# Patient Record
Sex: FEMALE | Race: BLACK OR AFRICAN AMERICAN | NOT HISPANIC OR LATINO
[De-identification: names, ages, dates, MRNs, and addresses within clinical notes are randomized per-mention and may not be internally consistent; named-entity substitution may affect disease eponyms.]

---

## 2017-12-25 ENCOUNTER — FORM ENCOUNTER (OUTPATIENT)
Age: 62
End: 2017-12-25

## 2017-12-26 ENCOUNTER — FORM ENCOUNTER (OUTPATIENT)
Age: 62
End: 2017-12-26

## 2017-12-26 ENCOUNTER — APPOINTMENT (OUTPATIENT)
Dept: ORTHOPEDIC SURGERY | Facility: CLINIC | Age: 62
End: 2017-12-26
Payer: COMMERCIAL

## 2017-12-26 ENCOUNTER — OUTPATIENT (OUTPATIENT)
Dept: OUTPATIENT SERVICES | Facility: HOSPITAL | Age: 62
LOS: 1 days | End: 2017-12-26
Payer: COMMERCIAL

## 2017-12-26 VITALS — HEIGHT: 64 IN | WEIGHT: 175 LBS | BODY MASS INDEX: 29.88 KG/M2

## 2017-12-26 PROCEDURE — 73502 X-RAY EXAM HIP UNI 2-3 VIEWS: CPT | Mod: 26,LT

## 2017-12-26 PROCEDURE — 99214 OFFICE O/P EST MOD 30 MIN: CPT

## 2017-12-26 PROCEDURE — 73502 X-RAY EXAM HIP UNI 2-3 VIEWS: CPT

## 2017-12-26 RX ORDER — NAPROXEN 500 MG/1
TABLET ORAL
Refills: 0 | Status: COMPLETED | COMMUNITY

## 2019-02-25 ENCOUNTER — FORM ENCOUNTER (OUTPATIENT)
Age: 64
End: 2019-02-25

## 2019-02-26 ENCOUNTER — OUTPATIENT (OUTPATIENT)
Dept: OUTPATIENT SERVICES | Facility: HOSPITAL | Age: 64
LOS: 1 days | End: 2019-02-26
Payer: COMMERCIAL

## 2019-02-26 ENCOUNTER — APPOINTMENT (OUTPATIENT)
Dept: ORTHOPEDIC SURGERY | Facility: CLINIC | Age: 64
End: 2019-02-26
Payer: MEDICAID

## 2019-02-26 VITALS — BODY MASS INDEX: 28.17 KG/M2 | HEIGHT: 64 IN | WEIGHT: 165 LBS

## 2019-02-26 PROCEDURE — 99214 OFFICE O/P EST MOD 30 MIN: CPT

## 2019-02-26 PROCEDURE — 73564 X-RAY EXAM KNEE 4 OR MORE: CPT | Mod: 26,LT

## 2019-02-26 PROCEDURE — 73502 X-RAY EXAM HIP UNI 2-3 VIEWS: CPT

## 2019-02-26 PROCEDURE — 73564 X-RAY EXAM KNEE 4 OR MORE: CPT

## 2019-02-26 PROCEDURE — 73502 X-RAY EXAM HIP UNI 2-3 VIEWS: CPT | Mod: 26,LT

## 2019-02-26 NOTE — PHYSICAL EXAM
[de-identified] : Constitutional: Well appearing. No acute distress.\par Mental Status: Alert & oriented to person, place and time. Normal affect.\par Pulmonary: No respiratory distress. Normal chest excursion.\par \par Gait: Severely coxalgic.\par Ambulatory assist devices: None.\par \par Cervical spine: Skin intact. No visible deformity. Painless active ROM without evident restriction.\par Bilateral upper extremities: Skin intact. No deformity. Painless active ROM without evident restriction.\par Thoracolumbar spine: No deformity. No tenderness. No radicular pain on passive straight leg raise bilaterally.\par Pelvis: No pelvic obliquity. No tenderness.\par Leg lengths: Left leg almost 1cm shorter than right.\par \par Right Hip:\par Skin intact.\par Well healed surgical scar.\par No erythema.\par No ecchymosis.\par No swelling.\par No deformity.\par No focal tenderness.\par Painless ROM from full extension to 90 degrees of flexion. 0-5 degrees of internal rotation. 55 degrees of external rotation. 45-50 degrees of abduction. 20 degrees of adduction.\par No crepitation.\par No instability.\par TISH painless.\par Impingement (FADIR) painless.\par Stinchfield painless.\par Abductor power 5/5.\par Flexor power 5/5.\par \par Left Hip:\par Skin intact.\par No surgical scars.\par No erythema.\par No ecchymosis.\par No swelling.\par No deformity.\par No focal tenderness.\par Painful ROM from full extension to 45 degrees of flexion. 10 degrees of obligate external rotation. 15 degrees of external rotation. 5 degrees of abduction. 0 degrees of adduction.\par No crepitation.\par No instability.\par TISH painful.\par Impingement (FADIR) painful.\par Stinchfield painful.\par Abductor power 5-/5.\par Flexor power 4/5 with pain.\par \par Bilateral Knees: Skin intact. No surgical scars. No erythema or ecchymosis. No swelling or effusion. No deformity. No focal tenderness. Painless and unrestricted range of motion. Central patellar tracking. No crepitation. No instability. \par \par Neurological: Intact distal crude touch sensation. Normal distal motor power.\par Cardiovascular: Palpable dorsalis pedis and posterior tibialis pulses. Brisk capillary refill. No peripheral edema.\par Lymphatics: No peripheral adenopathy appreciated. [de-identified] : X-ray imaging of the bilateral hips and pelvis done here today demonstrates severe bone on bone osteoarthritis of the left hip, flattening of femoral head, lateral subluxation of the joint, large osteophytes, subchondral cystic changes, shows a well-fixed, well-aligned right total hip replacement.\par \par

## 2019-02-26 NOTE — ADDENDUM
[FreeTextEntry1] : This note was written by Ivanna Gonzales on 02/26/2019  acting as scribe for Dr. Adams and [facility] Lee.\par

## 2019-02-26 NOTE — HISTORY OF PRESENT ILLNESS
[de-identified] : 64 y/o F presents today for left hip surgical consult. She is also s/p right THR 12/11/14 and is doing well with regards to the right hip. She reports marked, constant left hip pain and is unable to place shoes on the left foot. She can walk 5-10 blocks with a moderate limp and has to use stairs one step at a time. Patient reports that Mobic and lidocaine patches no longer work to relieve pain.

## 2019-02-26 NOTE — END OF VISIT
[FreeTextEntry3] : All medical record entries made by the Scribe were at my, Dr. Adams's, discretion and personally dictated by me on 02/26/2019. I have reviewed the chart and agree that the record accurately reflects my personal performance of the history, physical exam, assessment and plan. I have also personally directed, reviewed and agreed to the chart.

## 2019-02-26 NOTE — DISCUSSION/SUMMARY
[de-identified] : Patient has progressively severe hip pain and disability secondary to DJD and has failed extensive conservative care including activity modification, analgesic medications and therapeutic exercise. The patient was indicated for left total hip replacement.\par We discussed the details of the procedure, the expected recovery period, and the expected outcome. Bearing surface and fixation options were discussed, along with surgical approaches. For this patient I recommended a superior approach with possible extension to a posterior approach if appropriate for safe surgical exposure.\par We discussed the likelihood of satisfaction after complete recovery, and the potential causes of dissatisfaction. Specific risks of total hip replacement were discussed in detail. We discussed the risk of surgical site complications including but not limited to: surgical site infection, wound healing complications, bone fracture, prosthetic hip dislocation, neurovascular injury, hemorrhage, limb length inequality, persistent pain and need for reoperation. We discussed surgical blood loss and the possible need for blood transfusion. We discussed the risk of perioperative medical complications, including but not limited to catheter-associated urinary tract infection, venous thromboembolism and other cardiopulmonary complications. We discussed anesthetic options and the risk of anesthesia-related complications. We discussed the durability of prosthetic hips and limitations related to wear, osteolysis and loosening. The patient was given a copy of my preoperative packet with additional information about the procedure. I noted some changes in the information in the packet since the patient had her right THR. \par \par I wrote a prescription for Celebrex to manage pain before the surgery. Patient has a severely coxalgic limp and asked how the surgery will improve this. I informed her that I cannot promise she will lose her limp but it is very likely (~90). \par The patient gave informed consent for the surgical procedure and was instructed to speak to my surgical coordinator to arrange the logistics of surgical scheduling, presurgical testing, and medical optimization and clearance.

## 2019-03-01 ENCOUNTER — MEDICATION RENEWAL (OUTPATIENT)
Age: 64
End: 2019-03-01

## 2019-05-01 ENCOUNTER — FORM ENCOUNTER (OUTPATIENT)
Age: 64
End: 2019-05-01

## 2019-05-30 ENCOUNTER — FORM ENCOUNTER (OUTPATIENT)
Age: 64
End: 2019-05-30

## 2019-06-11 ENCOUNTER — FORM ENCOUNTER (OUTPATIENT)
Age: 64
End: 2019-06-11

## 2019-06-27 ENCOUNTER — OTHER (OUTPATIENT)
Age: 64
End: 2019-06-27

## 2019-07-01 ENCOUNTER — OUTPATIENT (OUTPATIENT)
Dept: OUTPATIENT SERVICES | Facility: HOSPITAL | Age: 64
LOS: 1 days | End: 2019-07-01
Payer: COMMERCIAL

## 2019-07-01 DIAGNOSIS — Z22.321 CARRIER OR SUSPECTED CARRIER OF METHICILLIN SUSCEPTIBLE STAPHYLOCOCCUS AUREUS: ICD-10-CM

## 2019-07-01 LAB
MRSA PCR RESULT.: NEGATIVE — SIGNIFICANT CHANGE UP
S AUREUS DNA NOSE QL NAA+PROBE: NEGATIVE — SIGNIFICANT CHANGE UP

## 2019-07-01 PROCEDURE — 87641 MR-STAPH DNA AMP PROBE: CPT

## 2019-07-02 ENCOUNTER — FORM ENCOUNTER (OUTPATIENT)
Age: 64
End: 2019-07-02

## 2019-07-05 ENCOUNTER — OUTPATIENT (OUTPATIENT)
Dept: OUTPATIENT SERVICES | Facility: HOSPITAL | Age: 64
LOS: 1 days | End: 2019-07-05
Payer: COMMERCIAL

## 2019-07-05 ENCOUNTER — APPOINTMENT (OUTPATIENT)
Dept: CT IMAGING | Facility: HOSPITAL | Age: 64
End: 2019-07-05
Payer: MEDICAID

## 2019-07-05 DIAGNOSIS — Z01.818 ENCOUNTER FOR OTHER PREPROCEDURAL EXAMINATION: ICD-10-CM

## 2019-07-05 LAB
ANION GAP SERPL CALC-SCNC: 10 MMOL/L — SIGNIFICANT CHANGE UP (ref 5–17)
APPEARANCE UR: CLEAR — SIGNIFICANT CHANGE UP
APTT BLD: 29.3 SEC — SIGNIFICANT CHANGE UP (ref 27.5–36.3)
BILIRUB UR-MCNC: NEGATIVE — SIGNIFICANT CHANGE UP
BUN SERPL-MCNC: 18 MG/DL — SIGNIFICANT CHANGE UP (ref 7–23)
CALCIUM SERPL-MCNC: 9.6 MG/DL — SIGNIFICANT CHANGE UP (ref 8.4–10.5)
CHLORIDE SERPL-SCNC: 105 MMOL/L — SIGNIFICANT CHANGE UP (ref 96–108)
CO2 SERPL-SCNC: 27 MMOL/L — SIGNIFICANT CHANGE UP (ref 22–31)
COLOR SPEC: YELLOW — SIGNIFICANT CHANGE UP
CREAT SERPL-MCNC: 0.88 MG/DL — SIGNIFICANT CHANGE UP (ref 0.5–1.3)
DIFF PNL FLD: NEGATIVE — SIGNIFICANT CHANGE UP
GLUCOSE SERPL-MCNC: 79 MG/DL — SIGNIFICANT CHANGE UP (ref 70–99)
GLUCOSE UR QL: NEGATIVE — SIGNIFICANT CHANGE UP
HBA1C BLD-MCNC: 5.2 % — SIGNIFICANT CHANGE UP (ref 4–5.6)
HCT VFR BLD CALC: 43.2 % — SIGNIFICANT CHANGE UP (ref 34.5–45)
HGB BLD-MCNC: 13.5 G/DL — SIGNIFICANT CHANGE UP (ref 11.5–15.5)
INR BLD: 1.04 — SIGNIFICANT CHANGE UP (ref 0.88–1.16)
KETONES UR-MCNC: NEGATIVE — SIGNIFICANT CHANGE UP
LEUKOCYTE ESTERASE UR-ACNC: NEGATIVE — SIGNIFICANT CHANGE UP
MCHC RBC-ENTMCNC: 29.2 PG — SIGNIFICANT CHANGE UP (ref 27–34)
MCHC RBC-ENTMCNC: 31.3 GM/DL — LOW (ref 32–36)
MCV RBC AUTO: 93.3 FL — SIGNIFICANT CHANGE UP (ref 80–100)
NITRITE UR-MCNC: NEGATIVE — SIGNIFICANT CHANGE UP
NRBC # BLD: 0 /100 WBCS — SIGNIFICANT CHANGE UP (ref 0–0)
PH UR: 6 — SIGNIFICANT CHANGE UP (ref 5–8)
PLATELET # BLD AUTO: 326 K/UL — SIGNIFICANT CHANGE UP (ref 150–400)
POTASSIUM SERPL-MCNC: 5 MMOL/L — SIGNIFICANT CHANGE UP (ref 3.5–5.3)
POTASSIUM SERPL-SCNC: 5 MMOL/L — SIGNIFICANT CHANGE UP (ref 3.5–5.3)
PROT UR-MCNC: NEGATIVE MG/DL — SIGNIFICANT CHANGE UP
PROTHROM AB SERPL-ACNC: 11.8 SEC — SIGNIFICANT CHANGE UP (ref 10–12.9)
RBC # BLD: 4.63 M/UL — SIGNIFICANT CHANGE UP (ref 3.8–5.2)
RBC # FLD: 12.8 % — SIGNIFICANT CHANGE UP (ref 10.3–14.5)
SODIUM SERPL-SCNC: 142 MMOL/L — SIGNIFICANT CHANGE UP (ref 135–145)
SP GR SPEC: 1.01 — SIGNIFICANT CHANGE UP (ref 1–1.03)
UROBILINOGEN FLD QL: 0.2 E.U./DL — SIGNIFICANT CHANGE UP
WBC # BLD: 6.6 K/UL — SIGNIFICANT CHANGE UP (ref 3.8–10.5)
WBC # FLD AUTO: 6.6 K/UL — SIGNIFICANT CHANGE UP (ref 3.8–10.5)

## 2019-07-05 PROCEDURE — 93005 ELECTROCARDIOGRAM TRACING: CPT

## 2019-07-05 PROCEDURE — 87086 URINE CULTURE/COLONY COUNT: CPT

## 2019-07-05 PROCEDURE — 73700 CT LOWER EXTREMITY W/O DYE: CPT

## 2019-07-05 PROCEDURE — 83036 HEMOGLOBIN GLYCOSYLATED A1C: CPT

## 2019-07-05 PROCEDURE — 80048 BASIC METABOLIC PNL TOTAL CA: CPT

## 2019-07-05 PROCEDURE — 73700 CT LOWER EXTREMITY W/O DYE: CPT | Mod: 26,LT

## 2019-07-05 PROCEDURE — 93010 ELECTROCARDIOGRAM REPORT: CPT

## 2019-07-05 PROCEDURE — 85730 THROMBOPLASTIN TIME PARTIAL: CPT

## 2019-07-05 PROCEDURE — 85027 COMPLETE CBC AUTOMATED: CPT

## 2019-07-05 PROCEDURE — 85610 PROTHROMBIN TIME: CPT

## 2019-07-05 PROCEDURE — 81003 URINALYSIS AUTO W/O SCOPE: CPT

## 2019-07-06 LAB
CULTURE RESULTS: SIGNIFICANT CHANGE UP
SPECIMEN SOURCE: SIGNIFICANT CHANGE UP

## 2019-07-10 LAB
ANABASINE UR-MCNC: <2 NG/ML — SIGNIFICANT CHANGE UP
COTININE UR-MCNC: <5 NG/ML — SIGNIFICANT CHANGE UP
NICOTINE UR-MCNC: <5 NG/ML — SIGNIFICANT CHANGE UP
NORNICOTINE UR-MCNC: <2 NG/ML — SIGNIFICANT CHANGE UP

## 2019-07-16 VITALS
SYSTOLIC BLOOD PRESSURE: 133 MMHG | DIASTOLIC BLOOD PRESSURE: 77 MMHG | WEIGHT: 170.2 LBS | OXYGEN SATURATION: 96 % | HEART RATE: 70 BPM | TEMPERATURE: 98 F | HEIGHT: 64 IN | RESPIRATION RATE: 16 BRPM

## 2019-07-16 RX ORDER — CHLORHEXIDINE GLUCONATE 213 G/1000ML
1 SOLUTION TOPICAL EVERY 12 HOURS
Refills: 0 | Status: COMPLETED | OUTPATIENT
Start: 2019-07-17 | End: 2019-07-17

## 2019-07-16 NOTE — H&P ADULT - PROBLEM SELECTOR PLAN 1
Admit to Orthopaedic Service.  Presents today for elective LEFT THR, ROBOTIC ASSISTED.   Pt medically stable and cleared for procedure today by Dr. Bravo

## 2019-07-16 NOTE — H&P ADULT - NSICDXFAMILYHX_GEN_ALL_CORE_FT
FAMILY HISTORY:  Family history of malignant neoplasm of breast, Family history of breast cancer in mother

## 2019-07-16 NOTE — H&P ADULT - NSHPPHYSICALEXAM_GEN_ALL_CORE
GENERAL:  PE:   Decreased ROM secondary to pain. Rest of PE per medical clearance. GENERAL: NAD  PE:   Skin warm and well perfused. DP pulses palpable bilateral lower extremities. Sensation intact and equal bilateral lower extremities. EHL/TA/GS/FHL 5/5 bilateral lower extremities.   Decreased ROM left hip secondary to pain. Rest of PE per medical clearance.

## 2019-07-16 NOTE — H&P ADULT - HISTORY OF PRESENT ILLNESS
65yo f c/o left hip pain x   Presents today for elective LEFT THR, ROBOTIC ASSISTED. 63 y/o F c/o left hip pain x 2 years without preceding accident, injury or trauma. The patient occasionally ambulates with assistance of a cane. Denies numbness/tingling of the lower extremities. The patient states her hip pain radiates to her back and knee. Failed conservative therapies for her symptoms. The patient usually uses a lidocaine patch, mobic and celebrex for her pain. Denies hx of DVT.   Presents today for elective LEFT total hip replacement , ROBOTIC ASSISTED.

## 2019-07-16 NOTE — H&P ADULT - NSHPLABSRESULTS_GEN_ALL_CORE
preop cbc/bmp/coags/ua wnl per medical clearance  Preop EKG wnl per clearance  Preop chest x-ray wnl per clearance  nares negative preop cbc/bmp/coags/ua wnl per medical clearance  Preop EKG wnl per clearance  Preop chest x-ray wnl per clearance  nares negative for mrsa/mssa

## 2019-07-16 NOTE — H&P ADULT - NSICDXPASTSURGICALHX_GEN_ALL_CORE_FT
PAST SURGICAL HISTORY:  History of hip replacement, total, right     History of lumpectomy of right breast radiation treatment  2013    Other acquired absence of organ History of cholecystectomy

## 2019-07-17 ENCOUNTER — RESULT REVIEW (OUTPATIENT)
Age: 64
End: 2019-07-17

## 2019-07-17 ENCOUNTER — INPATIENT (INPATIENT)
Facility: HOSPITAL | Age: 64
LOS: 1 days | Discharge: HOME | DRG: 470 | End: 2019-07-19
Attending: ORTHOPAEDIC SURGERY | Admitting: ORTHOPAEDIC SURGERY
Payer: COMMERCIAL

## 2019-07-17 ENCOUNTER — APPOINTMENT (OUTPATIENT)
Dept: ORTHOPEDIC SURGERY | Facility: HOSPITAL | Age: 64
End: 2019-07-17

## 2019-07-17 ENCOUNTER — MEDICATION RENEWAL (OUTPATIENT)
Age: 64
End: 2019-07-17

## 2019-07-17 DIAGNOSIS — Z96.641 PRESENCE OF RIGHT ARTIFICIAL HIP JOINT: Chronic | ICD-10-CM

## 2019-07-17 DIAGNOSIS — I10 ESSENTIAL (PRIMARY) HYPERTENSION: ICD-10-CM

## 2019-07-17 DIAGNOSIS — M19.90 UNSPECIFIED OSTEOARTHRITIS, UNSPECIFIED SITE: ICD-10-CM

## 2019-07-17 DIAGNOSIS — Z85.3 PERSONAL HISTORY OF MALIGNANT NEOPLASM OF BREAST: ICD-10-CM

## 2019-07-17 DIAGNOSIS — Z98.890 OTHER SPECIFIED POSTPROCEDURAL STATES: Chronic | ICD-10-CM

## 2019-07-17 PROCEDURE — S2900 ROBOTIC SURGICAL SYSTEM: CPT

## 2019-07-17 PROCEDURE — 27130 TOTAL HIP ARTHROPLASTY: CPT | Mod: LT

## 2019-07-17 PROCEDURE — 86077 PHYS BLOOD BANK SERV XMATCH: CPT

## 2019-07-17 PROCEDURE — 20985 CPTR-ASST DIR MS PX: CPT

## 2019-07-17 PROCEDURE — 72170 X-RAY EXAM OF PELVIS: CPT | Mod: 26

## 2019-07-17 RX ORDER — CELECOXIB 200 MG/1
200 CAPSULE ORAL
Refills: 0 | Status: DISCONTINUED | OUTPATIENT
Start: 2019-07-17 | End: 2019-07-18

## 2019-07-17 RX ORDER — POLYETHYLENE GLYCOL 3350 17 G/17G
17 POWDER, FOR SOLUTION ORAL DAILY
Refills: 0 | Status: DISCONTINUED | OUTPATIENT
Start: 2019-07-17 | End: 2019-07-19

## 2019-07-17 RX ORDER — ASPIRIN/CALCIUM CARB/MAGNESIUM 324 MG
325 TABLET ORAL
Refills: 0 | Status: DISCONTINUED | OUTPATIENT
Start: 2019-07-17 | End: 2019-07-19

## 2019-07-17 RX ORDER — HYDROMORPHONE HYDROCHLORIDE 2 MG/ML
0.5 INJECTION INTRAMUSCULAR; INTRAVENOUS; SUBCUTANEOUS EVERY 4 HOURS
Refills: 0 | Status: DISCONTINUED | OUTPATIENT
Start: 2019-07-17 | End: 2019-07-19

## 2019-07-17 RX ORDER — KETOROLAC TROMETHAMINE 30 MG/ML
15 SYRINGE (ML) INJECTION EVERY 6 HOURS
Refills: 0 | Status: DISCONTINUED | OUTPATIENT
Start: 2019-07-17 | End: 2019-07-17

## 2019-07-17 RX ORDER — AMLODIPINE BESYLATE 2.5 MG/1
10 TABLET ORAL DAILY
Refills: 0 | Status: DISCONTINUED | OUTPATIENT
Start: 2019-07-17 | End: 2019-07-19

## 2019-07-17 RX ORDER — ACETAMINOPHEN 500 MG
650 TABLET ORAL EVERY 6 HOURS
Refills: 0 | Status: DISCONTINUED | OUTPATIENT
Start: 2019-07-17 | End: 2019-07-19

## 2019-07-17 RX ORDER — BUPIVACAINE 13.3 MG/ML
20 INJECTION, SUSPENSION, LIPOSOMAL INFILTRATION ONCE
Refills: 0 | Status: DISCONTINUED | OUTPATIENT
Start: 2019-07-17 | End: 2019-07-19

## 2019-07-17 RX ORDER — LISINOPRIL 2.5 MG/1
20 TABLET ORAL DAILY
Refills: 0 | Status: DISCONTINUED | OUTPATIENT
Start: 2019-07-17 | End: 2019-07-19

## 2019-07-17 RX ORDER — CEFAZOLIN SODIUM 1 G
2000 VIAL (EA) INJECTION EVERY 8 HOURS
Refills: 0 | Status: DISCONTINUED | OUTPATIENT
Start: 2019-07-17 | End: 2019-07-17

## 2019-07-17 RX ORDER — OXYCODONE HYDROCHLORIDE 5 MG/1
5 TABLET ORAL EVERY 4 HOURS
Refills: 0 | Status: DISCONTINUED | OUTPATIENT
Start: 2019-07-17 | End: 2019-07-19

## 2019-07-17 RX ORDER — DOCUSATE SODIUM 100 MG
100 CAPSULE ORAL THREE TIMES A DAY
Refills: 0 | Status: DISCONTINUED | OUTPATIENT
Start: 2019-07-17 | End: 2019-07-19

## 2019-07-17 RX ORDER — PANTOPRAZOLE SODIUM 20 MG/1
40 TABLET, DELAYED RELEASE ORAL
Refills: 0 | Status: DISCONTINUED | OUTPATIENT
Start: 2019-07-17 | End: 2019-07-19

## 2019-07-17 RX ORDER — ACETAMINOPHEN 500 MG
1000 TABLET ORAL ONCE
Refills: 0 | Status: COMPLETED | OUTPATIENT
Start: 2019-07-17 | End: 2019-07-17

## 2019-07-17 RX ORDER — CEFAZOLIN SODIUM 1 G
2000 VIAL (EA) INJECTION EVERY 8 HOURS
Refills: 0 | Status: COMPLETED | OUTPATIENT
Start: 2019-07-17 | End: 2019-07-17

## 2019-07-17 RX ORDER — OXYCODONE HYDROCHLORIDE 5 MG/1
10 TABLET ORAL EVERY 4 HOURS
Refills: 0 | Status: DISCONTINUED | OUTPATIENT
Start: 2019-07-17 | End: 2019-07-19

## 2019-07-17 RX ORDER — ONDANSETRON 8 MG/1
4 TABLET, FILM COATED ORAL EVERY 6 HOURS
Refills: 0 | Status: DISCONTINUED | OUTPATIENT
Start: 2019-07-17 | End: 2019-07-19

## 2019-07-17 RX ORDER — SODIUM CHLORIDE 9 MG/ML
1000 INJECTION, SOLUTION INTRAVENOUS
Refills: 0 | Status: DISCONTINUED | OUTPATIENT
Start: 2019-07-17 | End: 2019-07-19

## 2019-07-17 RX ORDER — CELECOXIB 200 MG/1
400 CAPSULE ORAL ONCE
Refills: 0 | Status: COMPLETED | OUTPATIENT
Start: 2019-07-17 | End: 2019-07-17

## 2019-07-17 RX ORDER — APREPITANT 80 MG/1
40 CAPSULE ORAL ONCE
Refills: 0 | Status: COMPLETED | OUTPATIENT
Start: 2019-07-17 | End: 2019-07-17

## 2019-07-17 RX ORDER — SENNA PLUS 8.6 MG/1
2 TABLET ORAL AT BEDTIME
Refills: 0 | Status: DISCONTINUED | OUTPATIENT
Start: 2019-07-17 | End: 2019-07-19

## 2019-07-17 RX ORDER — MAGNESIUM HYDROXIDE 400 MG/1
30 TABLET, CHEWABLE ORAL DAILY
Refills: 0 | Status: DISCONTINUED | OUTPATIENT
Start: 2019-07-17 | End: 2019-07-19

## 2019-07-17 RX ORDER — AMLODIPINE BESYLATE AND BENAZEPRIL HYDROCHLORIDE 10; 20 MG/1; MG/1
1 CAPSULE ORAL
Qty: 0 | Refills: 0 | DISCHARGE

## 2019-07-17 RX ADMIN — Medication 650 MILLIGRAM(S): at 13:46

## 2019-07-17 RX ADMIN — Medication 15 MILLIGRAM(S): at 17:07

## 2019-07-17 RX ADMIN — Medication 1000 MILLIGRAM(S): at 06:14

## 2019-07-17 RX ADMIN — CHLORHEXIDINE GLUCONATE 1 APPLICATION(S): 213 SOLUTION TOPICAL at 06:15

## 2019-07-17 RX ADMIN — Medication 15 MILLIGRAM(S): at 23:40

## 2019-07-17 RX ADMIN — Medication 100 MILLIGRAM(S): at 21:58

## 2019-07-17 RX ADMIN — Medication 650 MILLIGRAM(S): at 17:08

## 2019-07-17 RX ADMIN — Medication 650 MILLIGRAM(S): at 11:19

## 2019-07-17 RX ADMIN — OXYCODONE HYDROCHLORIDE 10 MILLIGRAM(S): 5 TABLET ORAL at 22:58

## 2019-07-17 RX ADMIN — Medication 15 MILLIGRAM(S): at 18:08

## 2019-07-17 RX ADMIN — Medication 15 MILLIGRAM(S): at 23:15

## 2019-07-17 RX ADMIN — Medication 2000 MILLIGRAM(S): at 17:07

## 2019-07-17 RX ADMIN — Medication 650 MILLIGRAM(S): at 23:15

## 2019-07-17 RX ADMIN — Medication 15 MILLIGRAM(S): at 11:19

## 2019-07-17 RX ADMIN — OXYCODONE HYDROCHLORIDE 10 MILLIGRAM(S): 5 TABLET ORAL at 13:48

## 2019-07-17 RX ADMIN — Medication 325 MILLIGRAM(S): at 17:07

## 2019-07-17 RX ADMIN — APREPITANT 40 MILLIGRAM(S): 80 CAPSULE ORAL at 06:13

## 2019-07-17 RX ADMIN — Medication 650 MILLIGRAM(S): at 23:40

## 2019-07-17 RX ADMIN — CELECOXIB 400 MILLIGRAM(S): 200 CAPSULE ORAL at 06:13

## 2019-07-17 RX ADMIN — Medication 650 MILLIGRAM(S): at 18:08

## 2019-07-17 RX ADMIN — OXYCODONE HYDROCHLORIDE 10 MILLIGRAM(S): 5 TABLET ORAL at 21:58

## 2019-07-17 RX ADMIN — OXYCODONE HYDROCHLORIDE 10 MILLIGRAM(S): 5 TABLET ORAL at 14:02

## 2019-07-17 NOTE — PHYSICAL THERAPY INITIAL EVALUATION ADULT - PERTINENT HX OF CURRENT PROBLEM, REHAB EVAL
65 y/o F c/o left hip pain x 2 years without preceding accident, injury or trauma. The patient occasionally ambulates with assistance of a cane. Denies numbness/tingling of the lower extremities. The patient states her hip pain radiates to her back and knee. Failed conservative therapies for her symptoms. Now s/p left THR (posterior)

## 2019-07-17 NOTE — OCCUPATIONAL THERAPY INITIAL EVALUATION ADULT - ADDITIONAL COMMENTS
Patient reports PTA being limited 2/2 pain, requiring assistance from sister for LB dressing. Patient reports walking independently w/o AD, however reports decreased distance 2/2 pain. Patient states she was able to walk from home to the bus approximately 4 blocks. Denies use of AD/AE

## 2019-07-17 NOTE — PROGRESS NOTE ADULT - SUBJECTIVE AND OBJECTIVE BOX
Orthopaedics Post Op Check    Procedure: left total hip replacement   Surgeon: Dr. Adams     Pt comfortable, without complaints  Denies CP, SOB, N/V, numbness/tingling     Vital Signs Last 24 Hrs  T(C): 35.8 (17 Jul 2019 10:30), Max: 36.6 (16 Jul 2019 14:00)  T(F): 96.5 (17 Jul 2019 10:30), Max: 97.8 (16 Jul 2019 14:00)  HR: 72 (17 Jul 2019 12:45) (56 - 74)  BP: 122/71 (17 Jul 2019 12:15) (91/58 - 133/77)  BP(mean): 90 (17 Jul 2019 12:15) (71 - 90)  RR: 13 (17 Jul 2019 12:45) (11 - 23)  SpO2: 98% (17 Jul 2019 12:45) (96% - 100%)  AVSS, NAD    Dressing C/D/I  General: Pt Alert and oriented     Pulses: DP pulses 2+  Sensation: SLT in tact and equal to distal bilateral lower extremities   Motor: EHL/FHL/TA/GS 5/5 motor strength bilaterally           Post op XR: left hip prosthesis in place     A/P: 64yFemale POD#0 s/p left THR   - Stable  - Pain Control  - DVT ppx:  bid   - Post op abx: Ancef  - PT, WBS: WBAT  - F/U AM Labs

## 2019-07-17 NOTE — OCCUPATIONAL THERAPY INITIAL EVALUATION ADULT - VISUAL ACUITY
Patient reports no changes to vision, denies diplopia or blurriness. Patient reports she should wear glasses, however her glasses recently broke.

## 2019-07-17 NOTE — OCCUPATIONAL THERAPY INITIAL EVALUATION ADULT - STANDING BALANCE: DYNAMIC, REHAB EVAL
fair minus/Patient ambulated approximately 5 ft forward and backward w/ CG assistance and RW for stability

## 2019-07-17 NOTE — OCCUPATIONAL THERAPY INITIAL EVALUATION ADULT - PLANNED THERAPY INTERVENTIONS, OT EVAL
ROM/balance training/motor coordination training/ADL retraining/strengthening/bed mobility training/neuromuscular re-education/transfer training

## 2019-07-17 NOTE — PHYSICAL THERAPY INITIAL EVALUATION ADULT - GAIT DEVIATIONS NOTED, PT EVAL
decreased step length/increased time in double stance/decreased velocity of limb motion/decreased swing-to-stance ratio/decreased stride length

## 2019-07-17 NOTE — PHYSICAL THERAPY INITIAL EVALUATION ADULT - PLANNED THERAPY INTERVENTIONS, PT EVAL
neuromuscular re-education/postural re-education/strengthening/transfer training/balance training/bed mobility training/gait training

## 2019-07-17 NOTE — OCCUPATIONAL THERAPY INITIAL EVALUATION ADULT - MD ORDER
Per chart, 65 y/o F c/o left hip pain x 2 years without preceding accident, injury or trauma. The patient occasionally ambulates with assistance of a cane. Denies numbness/tingling of the lower extremities. The patient states her hip pain radiates to her back and knee. Patient s/p Posterior L PERFECTO 7/17/19

## 2019-07-17 NOTE — OCCUPATIONAL THERAPY INITIAL EVALUATION ADULT - GENERAL OBSERVATIONS, REHAB EVAL
Patient cleared for OT by SHANTA Aguilar. Patient received semi-supine, NAD, agreeable to OT, +heplock L hand dominant, Patient cleared for OT by SHANTA Aguilar. Patient received semi-supine, NAD, agreeable to OT, +IV

## 2019-07-17 NOTE — PHYSICAL THERAPY INITIAL EVALUATION ADULT - ADDITIONAL COMMENTS
Patient lives with sister in private house with 4+14 steps to enter. Ambulates with SC outdoors at baseline. Denies Hx of falls.

## 2019-07-17 NOTE — PHYSICAL THERAPY INITIAL EVALUATION ADULT - CRITERIA FOR SKILLED THERAPEUTIC INTERVENTIONS
therapy frequency/anticipated equipment needs at discharge/anticipated discharge recommendation/predicted duration of therapy intervention/impairments found/functional limitations in following categories/risk reduction/prevention

## 2019-07-18 ENCOUNTER — TRANSCRIPTION ENCOUNTER (OUTPATIENT)
Age: 64
End: 2019-07-18

## 2019-07-18 LAB
ANION GAP SERPL CALC-SCNC: 9 MMOL/L — SIGNIFICANT CHANGE UP (ref 5–17)
BUN SERPL-MCNC: 23 MG/DL — SIGNIFICANT CHANGE UP (ref 7–23)
CALCIUM SERPL-MCNC: 8.4 MG/DL — SIGNIFICANT CHANGE UP (ref 8.4–10.5)
CHLORIDE SERPL-SCNC: 104 MMOL/L — SIGNIFICANT CHANGE UP (ref 96–108)
CO2 SERPL-SCNC: 23 MMOL/L — SIGNIFICANT CHANGE UP (ref 22–31)
CREAT SERPL-MCNC: 0.87 MG/DL — SIGNIFICANT CHANGE UP (ref 0.5–1.3)
GLUCOSE SERPL-MCNC: 122 MG/DL — HIGH (ref 70–99)
HCT VFR BLD CALC: 32.3 % — LOW (ref 34.5–45)
HGB BLD-MCNC: 10.2 G/DL — LOW (ref 11.5–15.5)
MCHC RBC-ENTMCNC: 28.9 PG — SIGNIFICANT CHANGE UP (ref 27–34)
MCHC RBC-ENTMCNC: 31.6 GM/DL — LOW (ref 32–36)
MCV RBC AUTO: 91.5 FL — SIGNIFICANT CHANGE UP (ref 80–100)
NRBC # BLD: 0 /100 WBCS — SIGNIFICANT CHANGE UP (ref 0–0)
PLATELET # BLD AUTO: 267 K/UL — SIGNIFICANT CHANGE UP (ref 150–400)
POTASSIUM SERPL-MCNC: 4.8 MMOL/L — SIGNIFICANT CHANGE UP (ref 3.5–5.3)
POTASSIUM SERPL-SCNC: 4.8 MMOL/L — SIGNIFICANT CHANGE UP (ref 3.5–5.3)
RBC # BLD: 3.53 M/UL — LOW (ref 3.8–5.2)
RBC # FLD: 12.5 % — SIGNIFICANT CHANGE UP (ref 10.3–14.5)
SODIUM SERPL-SCNC: 136 MMOL/L — SIGNIFICANT CHANGE UP (ref 135–145)
WBC # BLD: 11.98 K/UL — HIGH (ref 3.8–10.5)
WBC # FLD AUTO: 11.98 K/UL — HIGH (ref 3.8–10.5)

## 2019-07-18 PROCEDURE — 99223 1ST HOSP IP/OBS HIGH 75: CPT

## 2019-07-18 RX ORDER — LIDOCAINE 4 G/100G
1 CREAM TOPICAL
Qty: 0 | Refills: 0 | DISCHARGE

## 2019-07-18 RX ORDER — SENNA PLUS 8.6 MG/1
2 TABLET ORAL
Qty: 0 | Refills: 0 | DISCHARGE
Start: 2019-07-18

## 2019-07-18 RX ORDER — KETOROLAC TROMETHAMINE 30 MG/ML
15 SYRINGE (ML) INJECTION EVERY 6 HOURS
Refills: 0 | Status: DISCONTINUED | OUTPATIENT
Start: 2019-07-18 | End: 2019-07-18

## 2019-07-18 RX ORDER — ACETAMINOPHEN 500 MG
2 TABLET ORAL
Qty: 0 | Refills: 0 | DISCHARGE
Start: 2019-07-18

## 2019-07-18 RX ORDER — POLYETHYLENE GLYCOL 3350 17 G/17G
17 POWDER, FOR SOLUTION ORAL
Qty: 0 | Refills: 0 | DISCHARGE
Start: 2019-07-18

## 2019-07-18 RX ORDER — DOCUSATE SODIUM 100 MG
1 CAPSULE ORAL
Qty: 0 | Refills: 0 | DISCHARGE
Start: 2019-07-18

## 2019-07-18 RX ORDER — ASPIRIN/CALCIUM CARB/MAGNESIUM 324 MG
1 TABLET ORAL
Qty: 0 | Refills: 0 | DISCHARGE
Start: 2019-07-18

## 2019-07-18 RX ORDER — CELECOXIB 200 MG/1
200 CAPSULE ORAL
Refills: 0 | Status: DISCONTINUED | OUTPATIENT
Start: 2019-07-19 | End: 2019-07-19

## 2019-07-18 RX ORDER — CELECOXIB 200 MG/1
1 CAPSULE ORAL
Qty: 0 | Refills: 0 | DISCHARGE
Start: 2019-07-18

## 2019-07-18 RX ORDER — PANTOPRAZOLE SODIUM 20 MG/1
1 TABLET, DELAYED RELEASE ORAL
Qty: 0 | Refills: 0 | DISCHARGE
Start: 2019-07-18

## 2019-07-18 RX ORDER — BENZOCAINE AND MENTHOL 5; 1 G/100ML; G/100ML
1 LIQUID ORAL EVERY 4 HOURS
Refills: 0 | Status: DISCONTINUED | OUTPATIENT
Start: 2019-07-18 | End: 2019-07-19

## 2019-07-18 RX ADMIN — Medication 650 MILLIGRAM(S): at 07:00

## 2019-07-18 RX ADMIN — OXYCODONE HYDROCHLORIDE 10 MILLIGRAM(S): 5 TABLET ORAL at 19:10

## 2019-07-18 RX ADMIN — Medication 100 MILLIGRAM(S): at 11:23

## 2019-07-18 RX ADMIN — Medication 325 MILLIGRAM(S): at 06:04

## 2019-07-18 RX ADMIN — OXYCODONE HYDROCHLORIDE 10 MILLIGRAM(S): 5 TABLET ORAL at 08:47

## 2019-07-18 RX ADMIN — PANTOPRAZOLE SODIUM 40 MILLIGRAM(S): 20 TABLET, DELAYED RELEASE ORAL at 06:04

## 2019-07-18 RX ADMIN — POLYETHYLENE GLYCOL 3350 17 GRAM(S): 17 POWDER, FOR SOLUTION ORAL at 11:23

## 2019-07-18 RX ADMIN — Medication 650 MILLIGRAM(S): at 23:24

## 2019-07-18 RX ADMIN — Medication 100 MILLIGRAM(S): at 06:04

## 2019-07-18 RX ADMIN — Medication 2000 MILLIGRAM(S): at 00:15

## 2019-07-18 RX ADMIN — Medication 650 MILLIGRAM(S): at 19:08

## 2019-07-18 RX ADMIN — OXYCODONE HYDROCHLORIDE 10 MILLIGRAM(S): 5 TABLET ORAL at 09:47

## 2019-07-18 RX ADMIN — Medication 325 MILLIGRAM(S): at 18:08

## 2019-07-18 RX ADMIN — Medication 15 MILLIGRAM(S): at 23:39

## 2019-07-18 RX ADMIN — Medication 15 MILLIGRAM(S): at 23:24

## 2019-07-18 RX ADMIN — LISINOPRIL 20 MILLIGRAM(S): 2.5 TABLET ORAL at 06:05

## 2019-07-18 RX ADMIN — Medication 650 MILLIGRAM(S): at 12:22

## 2019-07-18 RX ADMIN — Medication 100 MILLIGRAM(S): at 21:30

## 2019-07-18 RX ADMIN — Medication 650 MILLIGRAM(S): at 06:05

## 2019-07-18 RX ADMIN — Medication 15 MILLIGRAM(S): at 11:22

## 2019-07-18 RX ADMIN — Medication 650 MILLIGRAM(S): at 18:08

## 2019-07-18 RX ADMIN — OXYCODONE HYDROCHLORIDE 10 MILLIGRAM(S): 5 TABLET ORAL at 18:10

## 2019-07-18 RX ADMIN — Medication 15 MILLIGRAM(S): at 12:37

## 2019-07-18 RX ADMIN — Medication 650 MILLIGRAM(S): at 11:22

## 2019-07-18 NOTE — PROGRESS NOTE ADULT - SUBJECTIVE AND OBJECTIVE BOX
Ortho Note    Pt comfortable without complaints, pain controlled  Denies CP, SOB, N/V, numbness/tingling     Vital Signs Last 24 Hrs  T(C): 36.9 (07-18-19 @ 15:22), Max: 36.9 (07-18-19 @ 15:22)  T(F): 98.4 (07-18-19 @ 15:22), Max: 98.4 (07-18-19 @ 15:22)  HR: 64 (07-18-19 @ 15:22) (56 - 64)  BP: 90/53 (07-18-19 @ 15:22) (90/53 - 115/67)  BP(mean): --  RR: 16 (07-18-19 @ 15:22) (16 - 16)  SpO2: 96% (07-18-19 @ 15:22) (96% - 96%)  AVSS    General: Pt Alert and oriented, NAD  left lower extremity:  DSG- aquacel CDI  Pulses: +DP, WWP feet  Sensation: SILT  Motor: 5/5 EHL/FHL/TA/GS                          10.2   11.98 )-----------( 267      ( 18 Jul 2019 05:58 )             32.3   18 Jul 2019 05:58    136    |  104    |  23     ----------------------------<  122    4.8     |  23     |  0.87           A/P: 64yFemale POD#1 s/p left total hip replacement superior  - Stable  - Pain Control- continue current regimen, well-controlled  - DVT ppx: ASA   - PT, WBS: WBAT  - dispo: home with [facility], tomorrow    Ortho Pager [phone]

## 2019-07-18 NOTE — DISCHARGE NOTE PROVIDER - CARE PROVIDER_API CALL
Scotty Adams)  Orthopaedic Surgery  [address], [address] [place] [address]  Phone: [phone]  Fax: [phone]  Follow Up Time: 2 weeks

## 2019-07-18 NOTE — CONSULT NOTE ADULT - ASSESSMENT
65yo F, PMH of HTN, OA, c/o chronic left hip pain x 2 years that failed conservative therapy. Admitted for elective LEFT total hip replacement , ROBOTIC ASSISTED by Dr. Adams, now POD-1. Medicine consulted for co-management.    1) Post-op state  * OOB-C  * Physical therapy evaluation  * Aggressive incentive spirometry  * Pain control and bowel regimen  * Mechanical LE ppx     2) HTN  * c/w amlodipine 10mg po daily.   * c/w LIsinopril 20mg po daily.     3) Leucocytosis, reactive.   * monitor.    4) VTE ppx.   *  bid

## 2019-07-18 NOTE — CONSULT NOTE ADULT - CONSULT REASON
Co-management    63yo F, PMH of HTN, OA, c/o chronic left hip pain x 2 years that failed conservative therapy. Admitted for elective LEFT total hip replacement , ROBOTIC ASSISTED by Dr. Adams, now POD-1. Medicine consulted for co-management.    PAST MEDICAL & SURGICAL HISTORY:  Essential hypertension: HTN (hypertension)  History of malignant neoplasm of breast: right  Arthropathy: Arthritis  History of lumpectomy of right breast: radiation treatment  2013  History of hip replacement, total, right: 2014  Other acquired absence of organ: History of cholecystectomy    Social: Denies etoh, smoking, drugs.     Allergies: NKDA    Home Medications:  acetaminophen 325 mg oral tablet: 2 tab(s) orally every 6 hours (18 Jul 2019 13:09)  aspirin 325 mg oral delayed release tablet: 1 tab(s) orally 2 times a day (18 Jul 2019 13:09)  celecoxib 200 mg oral capsule: 1 cap(s) orally 2 times a day (18 Jul 2019 13:09)  docusate sodium 100 mg oral capsule: 1 cap(s) orally 3 times a day (18 Jul 2019 13:09)  Lotrel 10 mg-40 mg oral capsule: 1 cap(s) orally once a day (17 Jul 2019 06:02)  oxycodone-acetaminophen 5 mg-325 mg oral tablet: 1-2 tab(s) orally every 4-6 hours, as needed for moderate to severe pain (18 Jul 2019 13:09)  pantoprazole 40 mg oral delayed release tablet: 1 tab(s) orally once a day (before a meal) (18 Jul 2019 13:09)  polyethylene glycol 3350 oral powder for reconstitution: 17 gram(s) orally once a day (18 Jul 2019 13:09)  senna oral tablet: 2 tab(s) orally once a day (at bedtime), As needed, Constipation (18 Jul 2019 13:09)

## 2019-07-18 NOTE — DISCHARGE NOTE PROVIDER - NSDCFUADDINST_GEN_ALL_CORE_FT
** Please see Dr. Adams's separate discharge instruction sheet. Your medications were sent to [facility], located on the first floor of [facility]. Take medications as prescribed.    ___________________________________  Weight bear as tolerated with assistive device.  No strenuous activity, heavy lifting, driving or returning to work until cleared by MD.  You may shower - dressing is water-resistant, no soaking in bathtubs.  Remove dressing after post op day 5-7, then leave incision open to air. Keep incision clean and dry.  Try to have regular bowel movements, take stool softener or laxative if necessary.  Swelling may travel all the way down leg to foot, this is normal and will subside in a few weeks.  Call to schedule an appt with Dr. Adams for follow up, if you have staples or sutures they will be removed in office.  Contact your doctor if you experience: fever greater than 101.5, chills, chest pain, difficulty breathing, redness or excessive drainage around the incision, other concerns.  Follow up with your primary care provider.

## 2019-07-18 NOTE — PROGRESS NOTE ADULT - SUBJECTIVE AND OBJECTIVE BOX
Ortho Note    Pt comfortable without complaints, pain controlled  Denies CP, SOB, N/V, numbness/tingling     Vital Signs Last 24 Hrs  T(C): 36.4 (07-18-19 @ 08:45), Max: 37.1 (07-18-19 @ 05:20)  T(F): 97.6 (07-18-19 @ 08:45), Max: 98.8 (07-18-19 @ 05:20)  HR: 66 (07-18-19 @ 08:45) (66 - 67)  BP: 103/64 (07-18-19 @ 08:45) (103/64 - 108/72)  BP(mean): --  RR: 17 (07-18-19 @ 08:45) (17 - 18)  SpO2: 97% (07-18-19 @ 08:45) (97% - 97%)      VSS  General: A&Ox3, NAD  LLE: Aquacell DSG C/D/I  Pulses: Foot WWP; DP pulse 2+; Cap refill < 2 sec  Sensation: SILT distally and symmetric to contralateral extremity  Motor: TA/EHL/FHL/GS 5/5 and symmetric to contralateral extremity                          10.2   11.98 )-----------( 267      ( 18 Jul 2019 05:58 )             32.3     18 Jul 2019 05:58    136    |  104    |  23     ----------------------------<  122    4.8     |  23     |  0.87     Ca    8.4        18 Jul 2019 05:58

## 2019-07-18 NOTE — DISCHARGE NOTE PROVIDER - NSDCCPCAREPLAN_GEN_ALL_CORE_FT
PRINCIPAL DISCHARGE DIAGNOSIS  Diagnosis: Osteoarthritis  Assessment and Plan of Treatment: left total hip replacement

## 2019-07-18 NOTE — CONSULT NOTE ADULT - SUBJECTIVE AND OBJECTIVE BOX
CC: No overnight events, no complaints.   Feeling well, pain is overall controlled.  Tolerates PO diet (+); Urination (+); Walked with PT (+)  Denies cp, sob, dizziness, HA, abdominal pain, n/v.  Rest of ROS negative.     Vital Signs Last 24 Hrs  T(C): 36.9 (18 Jul 2019 15:22), Max: 37.1 (18 Jul 2019 05:20)  T(F): 98.4 (18 Jul 2019 15:22), Max: 98.8 (18 Jul 2019 05:20)  HR: 64 (18 Jul 2019 15:22) (56 - 80)  BP: 90/53 (18 Jul 2019 15:22) (90/53 - 148/73)  BP(mean): --  RR: 16 (18 Jul 2019 15:22) (16 - 18)  SpO2: 96% (18 Jul 2019 15:22) (96% - 98%)    PHYSICAL EXAMINATION  * General: Not in acute distress. Awake and alert. Lying comfortably in bed.  * Head: Normocephalic, atraumatic.  * HEENT: ears no discharge, eyes PERRLA, nose no discharge, throat no exudates, normal tonsils.  * Neck: no JVD, supple.  * Lungs: Clear to auscultation, no rales, no wheezes.  * Cardio: Regular rate and rhythm, no murmurs, no rubs, no gallops. Good peripheral pulses.  * Abdomen: Soft, non-tender, non-distended, tympanic to percussion, no rebound, no guarding, no rigidity. Bowel sounds present. No suprapubic or CVA tenderness.  * : Deferred.  * Extremities: Acyanotic, no edema.  * Skin: Warm and dry.  * Neuro: Alert and oriented x 3. No focal deficits. Motor strength is 5/5 throughout. Sensation intact. Cranial nerves II-XII grossly intact.                           10.2   11.98 )-----------( 267      ( 18 Jul 2019 05:58 )             32.3     07-18    136  |  104  |  23  ----------------------------<  122<H>  4.8   |  23  |  0.87    Ca    8.4      18 Jul 2019 05:58    MEDICATIONS  (STANDING):  acetaminophen   Tablet .. 650 milliGRAM(s) Oral every 6 hours  amLODIPine   Tablet 10 milliGRAM(s) Oral daily  aspirin enteric coated 325 milliGRAM(s) Oral two times a day  BUpivacaine liposome 1.3% Injectable (no eMAR) 20 milliLiter(s) Local Injection once  docusate sodium 100 milliGRAM(s) Oral three times a day  ketorolac   Injectable 15 milliGRAM(s) IV Push every 6 hours  lactated ringers. 1000 milliLiter(s) (80 mL/Hr) IV Continuous <Continuous>  lisinopril 20 milliGRAM(s) Oral daily  pantoprazole    Tablet 40 milliGRAM(s) Oral before breakfast  polyethylene glycol 3350 17 Gram(s) Oral daily    MEDICATIONS  (PRN):  aluminum hydroxide/magnesium hydroxide/simethicone Suspension 30 milliLiter(s) Oral four times a day PRN Indigestion  benzocaine 15 mG/menthol 3.6 mG Lozenge 1 Lozenge Oral every 4 hours PRN Sore Throat  HYDROmorphone  Injectable 0.5 milliGRAM(s) IV Push every 4 hours PRN Severe Pain (7 - 10)  magnesium hydroxide Suspension 30 milliLiter(s) Oral daily PRN Constipation  ondansetron Injectable 4 milliGRAM(s) IV Push every 6 hours PRN Nausea and/or Vomiting  oxyCODONE    IR 5 milliGRAM(s) Oral every 4 hours PRN Mild Pain (1 - 3)  oxyCODONE    IR 10 milliGRAM(s) Oral every 4 hours PRN Moderate Pain (4 - 6)  senna 2 Tablet(s) Oral at bedtime PRN Constipation

## 2019-07-18 NOTE — DISCHARGE NOTE PROVIDER - NSDCACTIVITY_GEN_ALL_CORE
Do not drive or operate machinery/No heavy lifting/straining/Walking - Indoors allowed/Showering allowed

## 2019-07-18 NOTE — PROGRESS NOTE ADULT - ASSESSMENT
A/P: 64yFemale s/p L PERFECTO  - Stable  - Pain/Nausea Control adequate  - Home meds  - AM labs unremarkable  - DVT ppx:  BID  - WBS: WBAT LLE  - PT: Home w HPT  - Possible d/c today if clears PT      Ortho Pager [phone]

## 2019-07-18 NOTE — DISCHARGE NOTE PROVIDER - HOSPITAL COURSE
Admitted 7/17/19    Surgery- left total hip replacement (superior) 7/17/19    Danni-op Antibiotics    Pain control    DVT prophylaxis    OOB/Physical Therapy

## 2019-07-19 ENCOUNTER — TRANSCRIPTION ENCOUNTER (OUTPATIENT)
Age: 64
End: 2019-07-19

## 2019-07-19 VITALS
DIASTOLIC BLOOD PRESSURE: 58 MMHG | RESPIRATION RATE: 15 BRPM | TEMPERATURE: 98 F | HEART RATE: 64 BPM | OXYGEN SATURATION: 97 % | SYSTOLIC BLOOD PRESSURE: 106 MMHG

## 2019-07-19 PROCEDURE — 99232 SBSQ HOSP IP/OBS MODERATE 35: CPT

## 2019-07-19 RX ADMIN — PANTOPRAZOLE SODIUM 40 MILLIGRAM(S): 20 TABLET, DELAYED RELEASE ORAL at 05:22

## 2019-07-19 RX ADMIN — Medication 325 MILLIGRAM(S): at 05:22

## 2019-07-19 RX ADMIN — Medication 650 MILLIGRAM(S): at 00:24

## 2019-07-19 RX ADMIN — CELECOXIB 200 MILLIGRAM(S): 200 CAPSULE ORAL at 17:25

## 2019-07-19 RX ADMIN — Medication 650 MILLIGRAM(S): at 17:25

## 2019-07-19 RX ADMIN — CELECOXIB 200 MILLIGRAM(S): 200 CAPSULE ORAL at 17:31

## 2019-07-19 RX ADMIN — CELECOXIB 200 MILLIGRAM(S): 200 CAPSULE ORAL at 05:22

## 2019-07-19 RX ADMIN — Medication 650 MILLIGRAM(S): at 06:21

## 2019-07-19 RX ADMIN — OXYCODONE HYDROCHLORIDE 10 MILLIGRAM(S): 5 TABLET ORAL at 14:42

## 2019-07-19 RX ADMIN — CELECOXIB 200 MILLIGRAM(S): 200 CAPSULE ORAL at 06:22

## 2019-07-19 RX ADMIN — OXYCODONE HYDROCHLORIDE 10 MILLIGRAM(S): 5 TABLET ORAL at 15:02

## 2019-07-19 RX ADMIN — Medication 650 MILLIGRAM(S): at 17:31

## 2019-07-19 RX ADMIN — Medication 100 MILLIGRAM(S): at 05:22

## 2019-07-19 RX ADMIN — Medication 325 MILLIGRAM(S): at 17:26

## 2019-07-19 RX ADMIN — OXYCODONE HYDROCHLORIDE 10 MILLIGRAM(S): 5 TABLET ORAL at 06:21

## 2019-07-19 RX ADMIN — OXYCODONE HYDROCHLORIDE 10 MILLIGRAM(S): 5 TABLET ORAL at 05:21

## 2019-07-19 RX ADMIN — Medication 650 MILLIGRAM(S): at 05:21

## 2019-07-19 NOTE — PROGRESS NOTE ADULT - ASSESSMENT
63yo F, PMH of HTN, OA, c/o chronic left hip pain x 2 years that failed conservative therapy. Admitted for elective LEFT total hip replacement , ROBOTIC ASSISTED by Dr. Adams, now POD-2. Medicine consulted for co-management.    1) Post-op state  * OOB-C  * Physical therapy evaluation  * Aggressive incentive spirometry  * Pain control and bowel regimen  * Mechanical LE ppx     2) HTN  * c/w amlodipine 10mg po daily.   * c/w LIsinopril 20mg po daily.     3) Leucocytosis, reactive.   * monitor.    4) VTE ppx.   *  bid    Medically optimized for discharge

## 2019-07-19 NOTE — PROGRESS NOTE ADULT - SUBJECTIVE AND OBJECTIVE BOX
CC: No overnight events, no complaints.   Feeling well, pain is overall controlled.  Tolerates PO diet (+); Urination (+); Walked with PT (+)  Denies cp, sob, dizziness, HA, abdominal pain, n/v.  Rest of ROS negative.     Vital Signs Last 24 Hrs  T(C): 36.7 (19 Jul 2019 08:31), Max: 36.8 (18 Jul 2019 20:46)  T(F): 98 (19 Jul 2019 08:31), Max: 98.3 (18 Jul 2019 20:46)  HR: 64 (19 Jul 2019 08:31) (62 - 68)  BP: 106/58 (19 Jul 2019 08:31) (92/57 - 106/58)  BP(mean): --  RR: 15 (19 Jul 2019 08:31) (15 - 16)  SpO2: 97% (19 Jul 2019 08:31) (95% - 97%)    PHYSICAL EXAMINATION  * General: Not in acute distress. Awake and alert. Lying comfortably in bed.  * Head: Normocephalic, atraumatic.  * HEENT: ears no discharge, eyes PERRLA, nose no discharge, throat no exudates, normal tonsils.  * Neck: no JVD, supple.  * Lungs: Clear to auscultation, no rales, no wheezes.  * Cardio: Regular rate and rhythm, no murmurs, no rubs, no gallops. Good peripheral pulses.  * Abdomen: Soft, non-tender, non-distended, tympanic to percussion, no rebound, no guarding, no rigidity. Bowel sounds present. No suprapubic or CVA tenderness.  * : Deferred.  * Extremities: Acyanotic, no edema.  * Skin: Warm and dry.  * Neuro: Alert and oriented x 3. No focal deficits. Motor strength is 5/5 throughout. Sensation intact. Cranial nerves II-XII grossly intact.       MEDICATIONS  (STANDING):  acetaminophen   Tablet .. 650 milliGRAM(s) Oral every 6 hours  amLODIPine   Tablet 10 milliGRAM(s) Oral daily  aspirin enteric coated 325 milliGRAM(s) Oral two times a day  BUpivacaine liposome 1.3% Injectable (no eMAR) 20 milliLiter(s) Local Injection once  celecoxib 200 milliGRAM(s) Oral two times a day  docusate sodium 100 milliGRAM(s) Oral three times a day  lactated ringers. 1000 milliLiter(s) (80 mL/Hr) IV Continuous <Continuous>  lisinopril 20 milliGRAM(s) Oral daily  pantoprazole    Tablet 40 milliGRAM(s) Oral before breakfast  polyethylene glycol 3350 17 Gram(s) Oral daily    MEDICATIONS  (PRN):  aluminum hydroxide/magnesium hydroxide/simethicone Suspension 30 milliLiter(s) Oral four times a day PRN Indigestion  benzocaine 15 mG/menthol 3.6 mG Lozenge 1 Lozenge Oral every 4 hours PRN Sore Throat  bisacodyl Suppository 10 milliGRAM(s) Rectal daily PRN If no bowel movement by POD#2  HYDROmorphone  Injectable 0.5 milliGRAM(s) IV Push every 4 hours PRN Severe Pain (7 - 10)  magnesium hydroxide Suspension 30 milliLiter(s) Oral daily PRN Constipation  ondansetron Injectable 4 milliGRAM(s) IV Push every 6 hours PRN Nausea and/or Vomiting  oxyCODONE    IR 5 milliGRAM(s) Oral every 4 hours PRN Mild Pain (1 - 3)  oxyCODONE    IR 10 milliGRAM(s) Oral every 4 hours PRN Moderate Pain (4 - 6)  senna 2 Tablet(s) Oral at bedtime PRN Constipation

## 2019-07-19 NOTE — DISCHARGE NOTE NURSING/CASE MANAGEMENT/SOCIAL WORK - NSDCDPATPORTLINK_GEN_ALL_CORE
You can access the [facility], offered by [facility], by registering with the following website: [email or web address]

## 2019-07-19 NOTE — DISCHARGE NOTE NURSING/CASE MANAGEMENT/SOCIAL WORK - NSDCPEEMAIL_GEN_ALL_CORE
[facility] for Tobacco Control email [email or web address]

## 2019-07-19 NOTE — PROGRESS NOTE ADULT - SUBJECTIVE AND OBJECTIVE BOX
Ortho Note    Pt comfortable without complaints, pain controlled  Denies CP, SOB, N/V, numbness/tingling     Vital Signs Last 24 Hrs  T(C): 36.8 (07-19-19 @ 05:13), Max: 36.8 (07-19-19 @ 05:13)  T(F): 98.2 (07-19-19 @ 05:13), Max: 98.2 (07-19-19 @ 05:13)  HR: 62 (07-19-19 @ 05:13) (62 - 62)  BP: 104/56 (07-19-19 @ 05:13) (104/56 - 104/56)  BP(mean): --  RR: 16 (07-19-19 @ 05:13) (16 - 16)  SpO2: 96% (07-19-19 @ 05:13) (96% - 96%)      VSS  General: A&Ox3, NAD  LLE: Aquacell DSG C/D/I  Pulses: Foot WWP; DP pulse 2+; Cap refill < 2 sec  Sensation: SILT distally and symmetric to contralateral extremity  Motor: TA/EHL/FHL/GS 5/5 and symmetric to contralateral extremity                          10.2   11.98 )-----------( 267      ( 18 Jul 2019 05:58 )             32.3     18 Jul 2019 05:58    136    |  104    |  23     ----------------------------<  122    4.8     |  23     |  0.87

## 2019-07-19 NOTE — DISCHARGE NOTE NURSING/CASE MANAGEMENT/SOCIAL WORK - NSDCPEWEB_GEN_ALL_CORE
[place] website --- [email or web address] for Tobacco Control website --- [email or web address]

## 2019-07-19 NOTE — PROGRESS NOTE ADULT - ASSESSMENT
A/P: 64yFemale s/p L PERFECTO  - Stable  - Pain/Nausea Control  - Home meds  - AM labs pending  - DVT ppx:  BID  - WBS: WBAT LLE  - PT: rec'd home w HPT  - Likely d/c today      Ortho Pager [phone]

## 2019-07-22 PROCEDURE — 80048 BASIC METABOLIC PNL TOTAL CA: CPT

## 2019-07-22 PROCEDURE — 86901 BLOOD TYPING SEROLOGIC RH(D): CPT

## 2019-07-22 PROCEDURE — C1776: CPT

## 2019-07-22 PROCEDURE — 97535 SELF CARE MNGMENT TRAINING: CPT

## 2019-07-22 PROCEDURE — 86880 COOMBS TEST DIRECT: CPT

## 2019-07-22 PROCEDURE — S2900: CPT

## 2019-07-22 PROCEDURE — 72170 X-RAY EXAM OF PELVIS: CPT

## 2019-07-22 PROCEDURE — 86850 RBC ANTIBODY SCREEN: CPT

## 2019-07-22 PROCEDURE — 86870 RBC ANTIBODY IDENTIFICATION: CPT

## 2019-07-22 PROCEDURE — 97161 PT EVAL LOW COMPLEX 20 MIN: CPT

## 2019-07-22 PROCEDURE — 85027 COMPLETE CBC AUTOMATED: CPT

## 2019-07-22 PROCEDURE — 97116 GAIT TRAINING THERAPY: CPT

## 2019-07-22 PROCEDURE — 88300 SURGICAL PATH GROSS: CPT

## 2019-07-22 PROCEDURE — 86900 BLOOD TYPING SEROLOGIC ABO: CPT

## 2019-07-22 PROCEDURE — 36415 COLL VENOUS BLD VENIPUNCTURE: CPT

## 2019-07-24 DIAGNOSIS — M16.12 UNILATERAL PRIMARY OSTEOARTHRITIS, LEFT HIP: ICD-10-CM

## 2019-07-24 DIAGNOSIS — Z85.3 PERSONAL HISTORY OF MALIGNANT NEOPLASM OF BREAST: ICD-10-CM

## 2019-07-24 DIAGNOSIS — Z90.49 ACQUIRED ABSENCE OF OTHER SPECIFIED PARTS OF DIGESTIVE TRACT: ICD-10-CM

## 2019-07-24 DIAGNOSIS — I10 ESSENTIAL (PRIMARY) HYPERTENSION: ICD-10-CM

## 2019-07-24 LAB — SURGICAL PATHOLOGY STUDY: SIGNIFICANT CHANGE UP

## 2019-07-31 ENCOUNTER — FORM ENCOUNTER (OUTPATIENT)
Age: 64
End: 2019-07-31

## 2019-08-01 ENCOUNTER — OUTPATIENT (OUTPATIENT)
Dept: OUTPATIENT SERVICES | Facility: HOSPITAL | Age: 64
LOS: 1 days | End: 2019-08-01
Payer: COMMERCIAL

## 2019-08-01 ENCOUNTER — APPOINTMENT (OUTPATIENT)
Dept: ORTHOPEDIC SURGERY | Facility: CLINIC | Age: 64
End: 2019-08-01
Payer: COMMERCIAL

## 2019-08-01 DIAGNOSIS — Z96.641 PRESENCE OF RIGHT ARTIFICIAL HIP JOINT: Chronic | ICD-10-CM

## 2019-08-01 DIAGNOSIS — Z98.890 OTHER SPECIFIED POSTPROCEDURAL STATES: Chronic | ICD-10-CM

## 2019-08-01 PROCEDURE — 73501 X-RAY EXAM HIP UNI 1 VIEW: CPT | Mod: 26,LT

## 2019-08-01 PROCEDURE — 99024 POSTOP FOLLOW-UP VISIT: CPT

## 2019-08-01 PROCEDURE — 73501 X-RAY EXAM HIP UNI 1 VIEW: CPT

## 2019-08-01 NOTE — HISTORY OF PRESENT ILLNESS
[Healed] : healed [Neuro Intact] : an unremarkable neurological exam [Vascular Intact] : ~T peripheral vascular exam normal [Negative Kevan's] : maneuvers demonstrated a negative Kevan's sign [Doing Well] : is doing well [Excellent Pain Control] : has excellent pain control [No Sign of Infection] : is showing no signs of infection [Erythema] : not erythematous [Dehiscence] : not dehisced [de-identified] : First post op left total hip replacement, surgical date 7/17/19 [de-identified] : Patient is alert and oriented x3.\par Leg lengths clinically equal \par Left hip: Well-healed posterior surgical scar without erythema or ecchymosis. Acceptable post op swelling. ROM from full extension to 90 degrees of flexion. External rotation 40 degrees, internal rotation neutral, abduction 45 degrees, adduction 5 degrees. Calf soft and nontender. NVI. Flexor power 5/5.  [de-identified] : Patient presents for first post op visit s/p left superior PERFECTO 7/17/19. She states she is doing well with mild pain, well controlled with pain medication. She is ambulating with  a cane and doing home PT. She takes ASA BID for DVT ppx. [de-identified] : X-rays taken today demonstrate well-fixed total hip replacement in overall good alignment [de-identified] : Patient is progressing well 2 weeks post op. Advised to continue home exercises and gradually wean off cane. Continue ASA BID until 4 weeks post op. Follow up in 4-6 weeks.

## 2019-08-01 NOTE — ADDENDUM
[FreeTextEntry1] : Dr. Adams was physically present during the key portions the encounter, provided assistance as needed, and formulated the assessment and plan as documented above.\par \par

## 2019-09-03 ENCOUNTER — APPOINTMENT (OUTPATIENT)
Dept: ORTHOPEDIC SURGERY | Facility: CLINIC | Age: 64
End: 2019-09-03
Payer: MEDICAID

## 2019-09-03 ENCOUNTER — APPOINTMENT (OUTPATIENT)
Dept: VASCULAR SURGERY | Facility: CLINIC | Age: 64
End: 2019-09-03

## 2019-09-03 DIAGNOSIS — M16.12 UNILATERAL PRIMARY OSTEOARTHRITIS, LEFT HIP: ICD-10-CM

## 2019-09-03 PROCEDURE — 99024 POSTOP FOLLOW-UP VISIT: CPT

## 2019-09-03 RX ORDER — CELECOXIB 200 MG/1
200 CAPSULE ORAL DAILY
Qty: 30 | Refills: 1 | Status: DISCONTINUED | COMMUNITY
Start: 2019-03-01 | End: 2019-09-03

## 2019-09-03 RX ORDER — MELOXICAM 15 MG/1
15 TABLET ORAL
Qty: 30 | Refills: 1 | Status: DISCONTINUED | COMMUNITY
Start: 2017-12-26 | End: 2019-09-03

## 2019-09-03 RX ORDER — OXYCODONE AND ACETAMINOPHEN 5; 325 MG/1; MG/1
5-325 TABLET ORAL
Qty: 50 | Refills: 0 | Status: DISCONTINUED | COMMUNITY
Start: 2019-07-17 | End: 2019-09-03

## 2019-09-03 RX ORDER — ASPIRIN/ACETAMINOPHEN/CAFFEINE 500-325-65
325 POWDER IN PACKET (EA) ORAL
Qty: 60 | Refills: 0 | Status: DISCONTINUED | COMMUNITY
Start: 2019-07-17 | End: 2019-09-03

## 2019-09-03 RX ORDER — PANTOPRAZOLE 40 MG/1
40 TABLET, DELAYED RELEASE ORAL DAILY
Qty: 30 | Refills: 0 | Status: DISCONTINUED | COMMUNITY
Start: 2019-07-17 | End: 2019-09-03

## 2019-09-03 RX ORDER — CELECOXIB 200 MG/1
200 CAPSULE ORAL TWICE DAILY
Qty: 84 | Refills: 0 | Status: DISCONTINUED | COMMUNITY
Start: 2019-07-17 | End: 2019-09-03

## 2019-09-03 NOTE — HISTORY OF PRESENT ILLNESS
[Clean/Dry/Intact] : clean, dry and intact [Healed] : healed [Doing Well] : is doing well [Excellent Pain Control] : has excellent pain control [No Sign of Infection] : is showing no signs of infection [Chills] : no chills [Fever] : no fever [de-identified] : Second post op left total hip replacement, surgical date 7/17/19 [de-identified] : 64 year old female presents for second post op s/p left THR 7/17/19. She is doing well overall. She reports mild left hip pain localized to the thigh. She states that the pain occurs when she first stands up or after she's been on her feet for a long time. She also reports LLE swelling but states that this has been improving with time. Patient can walk 5-10 blocks with a slight limp. She uses a rail to ascend and descend stairs. [de-identified] : Patient is alert and oriented x3.\par Leg lengths clinically equal.\par \par Left hip: Well-healed surgical scar without erythema or ecchymosis, small area of scabbing on incision. Acceptable post-op swelling. ROM from full extension to 90 degrees of flexion. 0 degrees of internal rotation. 45 degrees of external rotation. 40 degrees of abduction. 0 degrees of adduction. Flexor power 4/5.\par Calf is soft and nontender.\par LLE swelling.\par NVI.\par \par Right Hip:\par Skin intact. (+) Well healed surgical scar. No erythema. No ecchymosis. No swelling. No deformity. No focal tenderness.\par Painless ROM from full extension to 95 degrees of flexion. 15 degrees of internal rotation. 55 degrees of external rotation. 45 degrees of abduction. 10-15 degrees of adduction.\par No crepitation. No instability.\par TISH painless. Impingement (FADIR) painless. Stinchfield painless. Flexor power 5/5. [de-identified] : No new imaging was reviewed at this time.\par  [de-identified] : Ms. AREVALO is a 64 year old F doing well s/p left THR 7/17/19. She has some LLE swelling and I told patient that this should subside with time. I also ordered a doppler US of the LLE to check for blood clots. I encouraged patient to continue working on regaining hip ROM. Patient can continue to take Tylenol for pain. I also prescribed Celebrex and told her not to take it concurrently with other antiinflammatories. I advised patient to walk down a hallway while looking at herself in a mirror so she can correct her gait.\par Follow up in 6-12 weeks with symptoms, one year post surgery or PRN.

## 2019-09-03 NOTE — END OF VISIT
[FreeTextEntry3] : All medical record entries made by Musa Gonzales acting as a scribe for the performing provider (Scotty Adams MD and/or UMU Lee) on 09/03/2019. All entries were dictated to me by the performing medical provider. In signing this record, the medical provider affirms that they have personally performed the history, physical exam, assessment and plan and have also directed, reviewed and agreed to the documentation in the chart.

## 2019-09-12 ENCOUNTER — APPOINTMENT (OUTPATIENT)
Dept: VASCULAR SURGERY | Facility: CLINIC | Age: 64
End: 2019-09-12
Payer: COMMERCIAL

## 2019-09-12 PROCEDURE — 93971 EXTREMITY STUDY: CPT

## 2019-11-16 NOTE — H&P ADULT - NSICDXPASTMEDICALHX_GEN_ALL_CORE_FT
Problem: Patient Centered Care  Goal: Patient preferences are identified and integrated in the patient's plan of care  Description  Interventions:  - What would you like us to know as we care for you?   - Provide timely, complete, and accurate informatio needed  - Instruct patient on self management of diabetes  Outcome: Progressing  Goal: Electrolytes maintained within normal limits  Description  INTERVENTIONS:  - Monitor labs and rhythm and assess patient for signs and symptoms of electrolyte imbalances on patient safety including physical limitations  - Instruct pt to call for assistance with activity based on assessment  - Modify environment to reduce risk of injury  - Provide assistive devices as appropriate  - Consider OT/PT consult to assist with str PAST MEDICAL HISTORY:  Arthropathy Arthritis    Essential hypertension HTN (hypertension)    History of malignant neoplasm of breast right

## 2019-11-18 ENCOUNTER — FORM ENCOUNTER (OUTPATIENT)
Age: 64
End: 2019-11-18

## 2019-12-03 ENCOUNTER — APPOINTMENT (OUTPATIENT)
Dept: ORTHOPEDIC SURGERY | Facility: CLINIC | Age: 64
End: 2019-12-03

## 2021-04-19 ENCOUNTER — APPOINTMENT (OUTPATIENT)
Dept: BREAST CENTER | Facility: CLINIC | Age: 66
End: 2021-04-19
Payer: COMMERCIAL

## 2021-04-19 VITALS
BODY MASS INDEX: 32.44 KG/M2 | DIASTOLIC BLOOD PRESSURE: 94 MMHG | SYSTOLIC BLOOD PRESSURE: 162 MMHG | HEIGHT: 64 IN | HEART RATE: 83 BPM | WEIGHT: 190 LBS

## 2021-04-19 DIAGNOSIS — M17.12 UNILATERAL PRIMARY OSTEOARTHRITIS, LEFT KNEE: ICD-10-CM

## 2021-04-19 DIAGNOSIS — Z47.1 AFTERCARE FOLLOWING JOINT REPLACEMENT SURGERY: ICD-10-CM

## 2021-04-19 DIAGNOSIS — M54.5 LOW BACK PAIN: ICD-10-CM

## 2021-04-19 DIAGNOSIS — Z96.641 AFTERCARE FOLLOWING JOINT REPLACEMENT SURGERY: ICD-10-CM

## 2021-04-19 DIAGNOSIS — M79.89 OTHER SPECIFIED SOFT TISSUE DISORDERS: ICD-10-CM

## 2021-04-19 DIAGNOSIS — Z96.642 AFTERCARE FOLLOWING JOINT REPLACEMENT SURGERY: ICD-10-CM

## 2021-04-19 DIAGNOSIS — Z96.649 PRESENCE OF UNSPECIFIED ARTIFICIAL HIP JOINT: ICD-10-CM

## 2021-04-19 PROCEDURE — 99213 OFFICE O/P EST LOW 20 MIN: CPT

## 2021-04-19 PROCEDURE — 99072 ADDL SUPL MATRL&STAF TM PHE: CPT

## 2021-04-19 RX ORDER — CELECOXIB 100 MG/1
100 CAPSULE ORAL TWICE DAILY
Qty: 60 | Refills: 1 | Status: DISCONTINUED | COMMUNITY
Start: 2019-09-03 | End: 2021-04-19

## 2021-04-19 RX ORDER — LIDOCAINE 5% 700 MG/1
5 PATCH TOPICAL
Refills: 0 | Status: DISCONTINUED | COMMUNITY
End: 2021-04-19

## 2021-09-30 PROBLEM — Z85.3 PERSONAL HISTORY OF BREAST CANCER: Status: ACTIVE | Noted: 2021-04-16

## 2021-09-30 PROBLEM — Z80.3 FAMILY HISTORY OF BREAST CANCER: Status: ACTIVE | Noted: 2021-04-16

## 2021-09-30 PROBLEM — Z08 ENCOUNTER FOR FOLLOW-UP SURVEILLANCE OF BREAST CANCER: Status: ACTIVE | Noted: 2021-04-16

## 2021-10-06 ENCOUNTER — APPOINTMENT (OUTPATIENT)
Dept: BREAST CENTER | Facility: CLINIC | Age: 66
End: 2021-10-06

## 2021-10-06 DIAGNOSIS — Z08 ENCOUNTER FOR FOLLOW-UP EXAMINATION AFTER COMPLETED TREATMENT FOR MALIGNANT NEOPLASM: ICD-10-CM

## 2021-10-06 DIAGNOSIS — Z80.3 FAMILY HISTORY OF MALIGNANT NEOPLASM OF BREAST: ICD-10-CM

## 2021-10-06 DIAGNOSIS — Z85.3 PERSONAL HISTORY OF MALIGNANT NEOPLASM OF BREAST: ICD-10-CM

## 2021-10-06 DIAGNOSIS — Z85.3 ENCOUNTER FOR FOLLOW-UP EXAMINATION AFTER COMPLETED TREATMENT FOR MALIGNANT NEOPLASM: ICD-10-CM

## 2021-10-18 ENCOUNTER — APPOINTMENT (OUTPATIENT)
Dept: BREAST CENTER | Facility: CLINIC | Age: 66
End: 2021-10-18

## 2022-09-28 ENCOUNTER — TRANSCRIBE ORDERS (OUTPATIENT)
Dept: OTHER | Age: 67
End: 2022-09-28

## 2022-09-28 DIAGNOSIS — Z17.1 MALIGNANT NEOPLASM OF UPPER-INNER QUADRANT OF RIGHT BREAST IN FEMALE, ESTROGEN RECEPTOR NEGATIVE (H): Primary | ICD-10-CM

## 2022-09-28 DIAGNOSIS — C50.211 MALIGNANT NEOPLASM OF UPPER-INNER QUADRANT OF RIGHT BREAST IN FEMALE, ESTROGEN RECEPTOR NEGATIVE (H): Primary | ICD-10-CM

## 2022-10-07 NOTE — PROGRESS NOTES
Department of Radiation Oncology  Radiation Therapy Center  [facility]  [address], [address]  [place] [address]  [phone]       Consultation Note    Name: Francie Polo MRN: [id number]   : 1955   Date of Service: 10/13/2022  Referring: Dr. Velazquez     Reason for consultation: Invasive ductal carcinoma of the RIGHT breast, ER+ / SD+ / HER2-, pT1cN0(sn), grade 1, -LVSI, +DCIS, negative margins    History of Present Illness   Ms. Polo is a 67 year old female with a recently diagnosed right breast cancer.     She initially underwent screening bilateral mammograms on 8/10/22. A developing density in the retroareolar RIGHT breast was noted. Diagnostic mammogram right breast 22 showed a spiculated mass of the central breast, 2:00, 3-4 cm behind the nipple. US showed an irregular hypoechoic solid mass, 0.8 x 0.7 x 0.7 cm. A biopsy was done, which revealed invasive ductal carcinoma, Kenwood grade 1, +DCIS nuclear grade 2, ER+ 99% / SD+ 54% / HER2- by IHC.     She underwent right lumpectomy with SLN biopsy on 22. Pathology demonstrated a 1.2 cm invasive ductal carcinoma, Kenwood grade 1, +DCIS nuclear grade 2 measuring 3.5 cm, negative margins (closest invasive 6 mm laterally; closest DCIS <1 mm laterally). No LVSI. 0/1 sentinel lymph node was positive.     She saw Dr. Batres 10/11/22. Adjuvant chemotherapy was not recommended. Adjuvant AI was discussed.     Today, she presents to discuss adjuvant radiotherapy. She has been feeling well since surgery. Surgical scar has healed well. She has chronic shoulder issues from heavy lifting and thus ROM in both shoulders are somewhat limited.     Past Medical History:   Past Medical History:   Diagnosis Date     Calculus of ureter 10/13/2022     Colorectal polyps 10/19/2020     CTS (carpal tunnel syndrome) 2011     Essential hypertension 2016     Heart block AV second degree 2021    Formatting of this  note might be different from the original. - 5/27/2021 dual chamber PPM     Malignant neoplasm of upper-inner quadrant of right breast in female, estrogen receptor positive (H) 8/31/2022     Pacemaker 9/16/2022    Formatting of this note is different from the original. Date of last device in office evaluation: 09/08/22  Following clinic: I   ?  and model: Medtronic ANTONY XT  MRI Pacemaker.    Date of implant: 05/27/21    ? Indication for device: 2nd degree AV block  ? Cardiac resynchronization therapy:   no    MRI Conditional:  yes o If No:  Reason why:    ? Battery longevity documented as l       Past Surgical History:   Lumpectomy + SLN biopsy 9/19/22    Chemotherapy History:  None    Radiation History:  None    Pregnant: No  Implanted Cardiac Devices: Yes    Medications:  Current Outpatient Medications   Medication     Apple Cider Vinegar 500 MG TABS     aspirin (ASA) 81 MG EC tablet     Biotin w/ Vitamins C & E (HAIR/SKIN/NAILS PO)     calcium carbonate-vitamin D (OS-[place] WITH D) 500-200 MG-UNIT tablet     cholecalciferol 50 MCG (2000 UT) tablet     lisinopril (ZESTRIL) 10 MG tablet     Multiple Vitamins-Minerals (MULTI FOR HER) CAPS     omeprazole (PRILOSEC) 20 MG DR capsule     Turmeric 500 MG CAPS     No current facility-administered medications for this visit.       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 Pertinent findings are noted in the HPI.    Physical Exam   ECOG Status: 0    Vitals:  BP (!) 147/98   Pulse 94   Resp 18   Wt 89.4 kg (197 lb)   SpO2 94%     Gen: Appears healthy, in NAD  Head: NC/AT  Eyes: PERRL, EOMI, sclera anicteric  Ears: No external auricular lesions  Nose/sinus: No rhinorrhea or epistaxis  Neck: Full ROM, supple  Pulm: No wheezing, stridor or respiratory distress, breathing on  "room air  CV: Extremities are warm and well-perfused, no cyanosis, no pedal edema  Musculoskeletal: Normal bulk and tone  Skin: Normal color and turgor  Neuro: A/Ox3, no focal deficits    Imaging/Path/Labs   Imaging: Reviewed    Path:   9/19/22  SPECIMEN      Procedure:    Excision (less than total mastectomy)      Specimen Laterality:    Right     TUMOR      Tumor Site:    Clock position      :    2 o'clock    Tumor Site:    Distance from nipple (Centimeters): 3 cm    Histologic Type:    Invasive carcinoma of no special type (ductal)    Histologic Grade (Edison Histologic Score):          Glandular (Acinar) / Tubular Differentiation:    Score 2      Nuclear Pleomorphism:    Score 2      Mitotic Rate:    Score 1      Overall Grade:    Grade 1 (scores of 3, 4 or 5)    Tumor Size:    Greatest dimension of largest invasive focus (Millimeters): 12 mm    Tumor Focality:    Single focus of invasive carcinoma    Ductal Carcinoma In Situ (DCIS):    Present      :    Positive for extensive intraductal component (EIC)      Size (Extent) of DCIS:    Estimated size (extent) of DCIS is at least (Millimeters): 35 mm      Architectural Patterns:    Cribriform      Architectural Patterns:    Solid      Nuclear Grade:    Grade II (intermediate)      Necrosis:    Present, central (expansive \"comedo\" necrosis)    Lobular Carcinoma In Situ (LCIS):    Not identified    Lymphovascular Invasion:    Not identified    Dermal Lymphovascular Invasion:    No skin present    Treatment Effect in the Breast:    No known presurgical therapy     MARGINS    Margin Status for Invasive Carcinoma:    All margins negative for invasive carcinoma      Distance from Invasive Carcinoma to Closest Margin:    6 mm      Closest Margin(s) to Invasive Carcinoma:    Lateral    Margin Status for DCIS:    All margins negative for DCIS      Distance from DCIS to Closest Margin:    Less than: 1 mm      Closest Margin(s) to DCIS:    Lateral    Margin Comment:   "  DCIS is <1 mm from the lateral margin over a combined 3 mm span     REGIONAL LYMPH NODES    Regional Lymph Node Status:          :    All regional lymph nodes negative for tumor      Total Number of Lymph Nodes Examined (sentinel and non-sentinel):    1      Number of [place] Nodes Examined:    1     PATHOLOGIC STAGE CLASSIFICATION (pTNM, AJCC 8th Edition)      Reporting of pT, pN, and (when applicable) pM categories is based on information available to the pathologist at the time the report is issued. As per the AJCC (Chapter 1, 8th Ed.) it is the managing physician s responsibility to establish the final pathologic stage based upon all pertinent information, including but potentially not limited to this pathology report.    pT Category:    pT1c    Regional Lymph Nodes Modifier:    (sn): [place] node(s) evaluated.    pN Category:    pN0     Labs: Reviewed    Assessment    Ms. Polo is a 67 year old female with a recently diagnosed invasive ductal carcinoma of the RIGHT breast, ER+ / [place]+ / HER2-, pT1cN0(sn), grade 1, -LVSI, +DCIS, negative margins, s/p lumpectomy + SLN biopsy 9/19/22.    Plan     1) We reviewed the diagnosis, surgical pathology, and natural history of disease.    2) Adjuvant radiotherapy is recommended at this time to decrease risk of ipsilateral breast tumor recurrence.     3) Discussed the logistics of radiotherapy to the right breast, as well as potential acute / late toxicities. Side effects may include but are not limited to fatigue, dermatitis, tissue fibrosis, and small risk of heart/lung injury.    4) Patient wishes to proceed with radiotherapy. Informed consent was obtained. Plan for 4005 cGy in 15 fractions.     5) RTC for CT simulation next week.     Patient was seen and discussed with Dr. Pugh.     Maru Thomson MD PGY4  Department of Radiation Oncology    I was present with the resident during the visit. I discussed the case with the resident and agree with the note as documented by the  resident.    Shayan Pugh M.D.  Department of Radiation Oncology  [facility]

## 2022-10-13 ENCOUNTER — OFFICE VISIT (OUTPATIENT)
Dept: RADIATION THERAPY | Facility: OUTPATIENT CENTER | Age: 67
End: 2022-10-13
Payer: COMMERCIAL

## 2022-10-13 VITALS
OXYGEN SATURATION: 94 % | WEIGHT: 197 LBS | HEART RATE: 94 BPM | DIASTOLIC BLOOD PRESSURE: 98 MMHG | RESPIRATION RATE: 18 BRPM | SYSTOLIC BLOOD PRESSURE: 147 MMHG

## 2022-10-13 DIAGNOSIS — Z17.0 MALIGNANT NEOPLASM OF UPPER-INNER QUADRANT OF RIGHT BREAST IN FEMALE, ESTROGEN RECEPTOR POSITIVE (H): Primary | ICD-10-CM

## 2022-10-13 DIAGNOSIS — C50.211 MALIGNANT NEOPLASM OF UPPER-INNER QUADRANT OF RIGHT BREAST IN FEMALE, ESTROGEN RECEPTOR POSITIVE (H): Primary | ICD-10-CM

## 2022-10-13 PROBLEM — Z95.0 PACEMAKER: Status: ACTIVE | Noted: 2022-09-16

## 2022-10-13 PROBLEM — N20.1 CALCULUS OF URETER: Status: ACTIVE | Noted: 2022-10-13

## 2022-10-13 PROBLEM — K62.1 COLORECTAL POLYPS: Status: ACTIVE | Noted: 2020-10-19

## 2022-10-13 PROBLEM — K63.5 COLORECTAL POLYPS: Status: ACTIVE | Noted: 2020-10-19

## 2022-10-13 PROBLEM — I44.1 HEART BLOCK AV SECOND DEGREE: Status: ACTIVE | Noted: 2021-05-28

## 2022-10-13 RX ORDER — TETRACYCLINE HCL 500 MG
CAPSULE ORAL
COMMUNITY

## 2022-10-13 RX ORDER — LISINOPRIL 10 MG/1
10 TABLET ORAL DAILY
COMMUNITY
Start: 2022-07-28

## 2022-10-13 RX ORDER — MULTIVIT-MIN/IRON/FOLIC ACID/K 18-600-40
1 CAPSULE ORAL DAILY
COMMUNITY

## 2022-10-13 RX ORDER — VIT C/B6/B5/MAGNESIUM/HERB 173 50-5-6-5MG
2000 CAPSULE ORAL
COMMUNITY

## 2022-10-13 NOTE — NURSING NOTE
REASON FOR APPOINTMENT   Type of Cancer: IDC ER/VA+  HER 2 -  Location: R breast  Date of Symptom Onset: found on routine mammogram screening.    TREATMENT TO-DATE FOR THIS CANCER  Surgery ? Dr. Massey 9/19/22   Chemotherapy ? Dr. Batres med onc. No chemo. Pt will do anti endocrine therapy s/p radiation.   Other Treatments for this Cancer ? Discussion about radiation today with plan for sim next week    PERSONAL HISTORY OF CANCER   Previous Cancer ? no   Prior Radiation ? no   Prior Chemotherapy ? no   Prior Hormonal Therapy ? Birth control on/off for 15 yrs    RECENT IMAGING STUDIES  Mammogram - in Care Everywhere    REFERRALS NEEDED  None at this time    VITALS  BP (!) 147/98   Pulse 94   Resp 18   Wt 89.4 kg (197 lb)   SpO2 94%     PACEMAKER/IMPLANTED CARDIAC DEVICE  - YES - pt has PingStamp pacemaker. Pt does home device checks. This RN will reach out to device nurses to review and set up checks for radiation.    PAIN  Denies breast pain.  Has chronic muscle aches - shoulders, knees - about 3 yrs - rest and ibuprofen.    PSYCHOSOCIAL  Marital Status:   Patient lives in [place] with  Jewel.  Number of children: 4  Working status: retired  Do you feel safe in your home? Yes    REVIEW OF SYSTEMS  Skin: no s/s of infection at lumpectomy/lymph node sites. negative  Eyes: glasses  Ears/Nose/Throat: negative  Respiratory: No shortness of breath, dyspnea on exertion, cough, or hemoptysis  Cardiovascular: irregular heart beat/ AV block - pacemaker  Gastrointestinal: negative  Genitourinary: negative  Musculoskeletal: joint pain and joint stiffness - shoulders and knees  Neurologic: negative  Psychiatric: negative  Hematologic/Lymphatic/Immunologic: negative  Endocrine: negative    WOMEN ONLY  Any chance you may be pregnant: No  Age at first period: 12  Date of last period: 50  Number of pregnancies: 4, with 4 c-sections  Birth Control pills: Yes  If yes, for how long: on /off for 15 yrs    Radiation  "Oncology Patient Teaching    Current Concern: \" I've read some educational info, it sounds like radiation is to prevent cancer recurrence. What are treatments like and side effects like?\"     Person involved with teaching: Patient and   Patient asked Questions: Yes  Patient was cooperative: Yes  Patient was receptive (willing to accept information given): Yes    Education Assessment  Comprehension ability: Medium  Knowledge level: Medium  Factors affecting teaching: None    Education Materials Given  Radiation Therapy and You  Radiation to the breast  Caring for Your Skin During Radiation ...    Educational Topics Discussed  Side effects, Medications, Activity, Nutrition, Adjustment to illness and When to call MD/RN    Response To Teaching  More review necessary    Do you have an advanced directive or living will? no  Are you DNR/DNI? no        "

## 2022-10-13 NOTE — LETTER
10/13/2022         RE: Francie Polo  [address] [facility] [address]        Dear Colleague,    Thank you for referring your patient, Francie Polo, to the RAD[facility]ATION TH[facility]APY CENTER. Please see a copy of my visit note below.       Department of Radiation Oncology  Radiation Therapy Center  [facility]  [address], [address]  [place] [address]  [phone]       Consultation Note    Name: Francie Polo MRN: [id number]   : 1955   Date of Service: 10/13/2022  Referring: Dr. Velazquez     Reason for consultation: Invasive ductal carcinoma of the RIGHT breast, ER+ / MA+ / HER2-, pT1cN0(sn), grade 1, -LVSI, +DCIS, negative margins    History of Present Illness   Ms. Polo is a 67 year old female with a recently diagnosed right breast cancer.     She initially underwent screening bilateral mammograms on 8/10/22. A developing density in the retroareolar RIGHT breast was noted. Diagnostic mammogram right breast 22 showed a spiculated mass of the central breast, 2:00, 3-4 cm behind the nipple. US showed an irregular hypoechoic solid mass, 0.8 x 0.7 x 0.7 cm. A biopsy was done, which revealed invasive ductal carcinoma, Lewis grade 1, +DCIS nuclear grade 2, ER+ 99% / MA+ 54% / HER2- by IHC.     She underwent right lumpectomy with SLN biopsy on 22. Pathology demonstrated a 1.2 cm invasive ductal carcinoma, Lewis grade 1, +DCIS nuclear grade 2 measuring 3.5 cm, negative margins (closest invasive 6 mm laterally; closest DCIS <1 mm laterally). No LVSI. 0/1 sentinel lymph node was positive.     She saw Dr. Batres 10/11/22. Adjuvant chemotherapy was not recommended. Adjuvant AI was discussed.     Today, she presents to discuss adjuvant radiotherapy. She has been feeling well since surgery. Surgical scar has healed well. She has chronic shoulder issues from heavy lifting and thus ROM in both shoulders are somewhat limited.     Past Medical History:   Past Medical  History:   Diagnosis Date     Calculus of ureter 10/13/2022     Colorectal polyps 10/19/2020     CTS (carpal tunnel syndrome) 11/16/2011     Essential hypertension 9/22/2016     Heart block AV second degree 5/28/2021    Formatting of this note might be different from the original. - 5/27/2021 dual chamber PPM     Malignant neoplasm of upper-inner quadrant of right breast in female, estrogen receptor positive (H) 8/31/2022     Pacemaker 9/16/2022    Formatting of this note is different from the original. Date of last device in office evaluation: 09/08/22  Following clinic: [facility]   ?  and model: Medtronic ANTONY XT  MRI Pacemaker.    Date of implant: 05/27/21    ? Indication for device: 2nd degree AV block  ? Cardiac resynchronization therapy:   no    MRI Conditional:  yes o If No:  Reason why:    ? Battery longevity documented as l       Past Surgical History:   Lumpectomy + SLN biopsy 9/19/22    Chemotherapy History:  None    Radiation History:  None    Pregnant: No  Implanted Cardiac Devices: Yes    Medications:  Current Outpatient Medications   Medication     Apple Cider Vinegar 500 MG TABS     aspirin (ASA) 81 MG EC tablet     Biotin w/ Vitamins C & E (HAIR/SKIN/NAILS PO)     calcium carbonate-vitamin D (OS-NATIVIDAD WITH D) 500-200 MG-UNIT tablet     cholecalciferol 50 MCG (2000 UT) tablet     lisinopril (ZESTRIL) 10 MG tablet     Multiple Vitamins-Minerals (MULTI FOR HER) CAPS     omeprazole (PRILOSEC) 20 MG DR capsule     Turmeric 500 MG CAPS     No current facility-administered medications for this visit.       Allergies:   No Known Allergies    Social History:  Tobacco: None  Employment: grows and sells produce    Family History:  Grandmother - breast cancer in her 60s    Review of Systems   A 10-point review of systems was performed. Pertinent findings are noted in the HPI.    Physical Exam   ECOG Status: 0    Vitals:  BP (!) 147/98   Pulse 94   Resp 18   Wt 89.4 kg (197 lb)   SpO2 94%     Gen:  "Appears healthy, in NAD  Head: NC/AT  Eyes: PERRL, EOMI, sclera anicteric  Ears: No external auricular lesions  Nose/sinus: No rhinorrhea or epistaxis  Neck: Full ROM, supple  Pulm: No wheezing, stridor or respiratory distress, breathing on room air  CV: Extremities are warm and well-perfused, no cyanosis, no pedal edema  Musculoskeletal: Normal bulk and tone  Skin: Normal color and turgor  Neuro: A/Ox3, no focal deficits    Imaging/Path/Labs   Imaging: Reviewed    Path:   9/19/22  SPECIMEN      Procedure:    Excision (less than total mastectomy)      Specimen Laterality:    Right     TUMOR      Tumor Site:    Clock position      :    2 o'clock    Tumor Site:    Distance from nipple (Centimeters): 3 cm    Histologic Type:    Invasive carcinoma of no special type (ductal)    Histologic Grade ([place] Histologic Score):          Glandular (Acinar) / Tubular Differentiation:    Score 2      Nuclear Pleomorphism:    Score 2      Mitotic Rate:    Score 1      Overall Grade:    Grade 1 (scores of 3, 4 or 5)    Tumor Size:    Greatest dimension of largest invasive focus (Millimeters): 12 mm    Tumor Focality:    Single focus of invasive carcinoma    Ductal Carcinoma In Situ (DCIS):    Present      :    Positive for extensive intraductal component (EIC)      Size (Extent) of DCIS:    Estimated size (extent) of DCIS is at least (Millimeters): 35 mm      Architectural Patterns:    Cribriform      Architectural Patterns:    Solid      Nuclear Grade:    Grade II (intermediate)      Necrosis:    Present, central (expansive \"comedo\" necrosis)    Lobular Carcinoma In Situ (LCIS):    Not identified    Lymphovascular Invasion:    Not identified    Dermal Lymphovascular Invasion:    No skin present    Treatment Effect in the Breast:    No known presurgical therapy     MARGINS    Margin Status for Invasive Carcinoma:    All margins negative for invasive carcinoma      Distance from Invasive Carcinoma to Closest Margin:    6 mm      " Closest Margin(s) to Invasive Carcinoma:    Lateral    Margin Status for DCIS:    All margins negative for DCIS      Distance from DCIS to Closest Margin:    Less than: 1 mm      Closest Margin(s) to DCIS:    Lateral    Margin Comment:    DCIS is <1 mm from the lateral margin over a combined 3 mm span     REGIONAL LYMPH NODES    Regional Lymph Node Status:          :    All regional lymph nodes negative for tumor      Total Number of Lymph Nodes Examined (sentinel and non-sentinel):    1      Number of [place] Nodes Examined:    1     PATHOLOGIC STAGE CLASSIFICATION (pTNM, AJCC 8th Edition)      Reporting of pT, pN, and (when applicable) pM categories is based on information available to the pathologist at the time the report is issued. As per the AJCC (Chapter 1, 8th Ed.) it is the managing physician s responsibility to establish the final pathologic stage based upon all pertinent information, including but potentially not limited to this pathology report.    pT Category:    pT1c    Regional Lymph Nodes Modifier:    (sn): [place] node(s) evaluated.    pN Category:    pN0     Labs: Reviewed    Assessment    Ms. Polo is a 67 year old female with a recently diagnosed invasive ductal carcinoma of the RIGHT breast, ER+ / LA+ / HER2-, pT1cN0(sn), grade 1, -LVSI, +DCIS, negative margins, s/p lumpectomy + SLN biopsy 9/19/22.    Plan     1) We reviewed the diagnosis, surgical pathology, and natural history of disease.    2) Adjuvant radiotherapy is recommended at this time to decrease risk of ipsilateral breast tumor recurrence.     3) Discussed the logistics of radiotherapy to the right breast, as well as potential acute / late toxicities. Side effects may include but are not limited to fatigue, dermatitis, tissue fibrosis, and small risk of heart/lung injury.    4) Patient wishes to proceed with radiotherapy. Informed consent was obtained. Plan for 4005 cGy in 15 fractions.     5) RTC for CT simulation next week.      Patient was seen and discussed with Dr. Pugh.     Maru Thomson MD PGY4  Department of Radiation Oncology    I was present with the resident during the visit. I discussed the case with the resident and agree with the note as documented by the resident.    Shayan Pugh M.D.  Department of Radiation Oncology  [facility]

## 2022-10-18 ENCOUNTER — OFFICE VISIT (OUTPATIENT)
Dept: RADIATION THERAPY | Facility: OUTPATIENT CENTER | Age: 67
End: 2022-10-18
Payer: COMMERCIAL

## 2022-10-18 DIAGNOSIS — Z17.0 MALIGNANT NEOPLASM OF UPPER-INNER QUADRANT OF RIGHT BREAST IN FEMALE, ESTROGEN RECEPTOR POSITIVE (H): Primary | ICD-10-CM

## 2022-10-18 DIAGNOSIS — C50.211 MALIGNANT NEOPLASM OF UPPER-INNER QUADRANT OF RIGHT BREAST IN FEMALE, ESTROGEN RECEPTOR POSITIVE (H): Primary | ICD-10-CM

## 2022-10-18 NOTE — PROGRESS NOTES
The patient underwent CT simulation.     Shayan Pugh M.D.  Department of Radiation Oncology  [facility]

## 2022-10-18 NOTE — LETTER
10/18/2022     RE: Francie Polo  [address] [address] [address]    Dear Colleague,    Thank you for referring your patient, Francie Polo, to the RADIATION THERAPY CENTER. Please see a copy of my visit note below.    The patient underwent CT simulation.     Shayan Pugh M.D.  Department of Radiation Oncology  [facility]         Again, thank you for allowing me to participate in the care of your patient.        Sincerely,        Shayan Pugh MD

## 2022-10-21 ENCOUNTER — TELEPHONE (OUTPATIENT)
Dept: RADIATION THERAPY | Facility: OUTPATIENT CENTER | Age: 67
End: 2022-10-21

## 2022-10-21 NOTE — TELEPHONE ENCOUNTER
Called Device nurse/[facility] ([phone] on 10/19/22, left VM asking for call back to set up pacmeaker checks during radiation.  Called again on 10/21/22 - talked with device nurse. Plan made for home checks - pre 10/24/22, mid 11/1/22, and post 11/14/22.    This RN called patient - left VM with details.

## 2022-10-24 ENCOUNTER — APPOINTMENT (OUTPATIENT)
Dept: RADIATION THERAPY | Facility: OUTPATIENT CENTER | Age: 67
End: 2022-10-24
Payer: COMMERCIAL

## 2022-10-25 ENCOUNTER — APPOINTMENT (OUTPATIENT)
Dept: RADIATION THERAPY | Facility: OUTPATIENT CENTER | Age: 67
End: 2022-10-25
Payer: COMMERCIAL

## 2022-10-26 ENCOUNTER — OFFICE VISIT (OUTPATIENT)
Dept: RADIATION THERAPY | Facility: OUTPATIENT CENTER | Age: 67
End: 2022-10-26
Payer: COMMERCIAL

## 2022-10-26 ENCOUNTER — APPOINTMENT (OUTPATIENT)
Dept: RADIATION THERAPY | Facility: OUTPATIENT CENTER | Age: 67
End: 2022-10-26
Payer: COMMERCIAL

## 2022-10-26 VITALS
HEART RATE: 92 BPM | OXYGEN SATURATION: 97 % | RESPIRATION RATE: 18 BRPM | DIASTOLIC BLOOD PRESSURE: 93 MMHG | SYSTOLIC BLOOD PRESSURE: 138 MMHG | WEIGHT: 197 LBS

## 2022-10-26 DIAGNOSIS — Z17.0 MALIGNANT NEOPLASM OF UPPER-INNER QUADRANT OF RIGHT BREAST IN FEMALE, ESTROGEN RECEPTOR POSITIVE (H): Primary | ICD-10-CM

## 2022-10-26 DIAGNOSIS — C50.211 MALIGNANT NEOPLASM OF UPPER-INNER QUADRANT OF RIGHT BREAST IN FEMALE, ESTROGEN RECEPTOR POSITIVE (H): Primary | ICD-10-CM

## 2022-10-26 NOTE — LETTER
10/26/2022         RE: Francie Polo  [address] [address] [address]        Dear Colleague,    Thank you for referring your patient, Francie Polo, to the RADIATION THERAPY CENTER. Please see a copy of my visit note below.    [facility]  SPECIALIZING IN BREAKTHROUGHS  Radiation Oncology    On Treatment Visit Note      Francie Polo      Date: 10/26/2022   MRN: [id number]   : 1955  Diagnosis: IDC ER/UT + Her2-      Reason for Visit:  On Radiation Treatment Visit     Treatment Summary to Date  Treatment Site: R breast Current Dose: 801/4005 cGy Fractions: 3/15      Chemotherapy  Chemo concurrent with radx?: No    Subjective:  Doing well. No acute complaints. Energy is good. Applying skin creams. No pruritus.      Nursing ROS:   Nutrition Alteration  Diet Type: Patient's Preference  Skin  Skin Reaction: 0 - No changes  Skin Note: pt started skin care as directed        Cardiovascular  Respiratory effort: 1 - Normal - without distress           Pain Assessment  0-10 Pain Scale: 0  Pain Note: some chronic shoulder pain - pt reports managing fine with treatment position      Objective:   BP (!) 138/93   Pulse 92   Resp 18   Wt 89.4 kg (197 lb)   SpO2 97%   Skin without erythema or desquamation     Labs:  CBC RESULTS: No results for input(s): WBC, RBC, HGB, HCT, MCV, MCH, MCHC, RDW, PLT in the last 17773 hours.  ELECTROLYTES:  No results for input(s): NA, POTASSIUM, CHLORIDE, NATIVIDAD, CO2, BUN, CR, GLC in the last 45844 hours.    Assessment:  Ms. Polo is a 67 year old female with a recently diagnosed invasive ductal carcinoma of the RIGHT breast, ER+ / UT+ / HER2-, pT1cN0(sn), grade 1, -LVSI, +DCIS, negative margins, s/p lumpectomy + SLN biopsy 22. She is undergoing adjuvant RT.    Tolerating radiation therapy well.  All questions and concerns addressed.    Plan:   1. Continue current therapy.    2. Continue skin care.       Mosaiq chart and setup information reviewed  Ports  checked    Medication Review  Med list reviewed with patient?: Yes    Educational Topic Discussed  Education Instructions: skin care and what SE to watch for      Shayan Pugh MD

## 2022-10-27 ENCOUNTER — APPOINTMENT (OUTPATIENT)
Dept: RADIATION THERAPY | Facility: OUTPATIENT CENTER | Age: 67
End: 2022-10-27
Payer: COMMERCIAL

## 2022-10-28 ENCOUNTER — APPOINTMENT (OUTPATIENT)
Dept: RADIATION THERAPY | Facility: OUTPATIENT CENTER | Age: 67
End: 2022-10-28
Payer: COMMERCIAL

## 2022-10-31 ENCOUNTER — APPOINTMENT (OUTPATIENT)
Dept: RADIATION THERAPY | Facility: OUTPATIENT CENTER | Age: 67
End: 2022-10-31
Payer: COMMERCIAL

## 2022-11-01 ENCOUNTER — APPOINTMENT (OUTPATIENT)
Dept: RADIATION THERAPY | Facility: OUTPATIENT CENTER | Age: 67
End: 2022-11-01
Payer: COMMERCIAL

## 2022-11-02 ENCOUNTER — APPOINTMENT (OUTPATIENT)
Dept: RADIATION THERAPY | Facility: OUTPATIENT CENTER | Age: 67
End: 2022-11-02
Payer: COMMERCIAL

## 2022-11-02 ENCOUNTER — OFFICE VISIT (OUTPATIENT)
Dept: RADIATION THERAPY | Facility: OUTPATIENT CENTER | Age: 67
End: 2022-11-02
Payer: COMMERCIAL

## 2022-11-02 VITALS
WEIGHT: 198 LBS | RESPIRATION RATE: 18 BRPM | HEART RATE: 98 BPM | DIASTOLIC BLOOD PRESSURE: 97 MMHG | SYSTOLIC BLOOD PRESSURE: 154 MMHG | OXYGEN SATURATION: 96 %

## 2022-11-02 DIAGNOSIS — Z17.0 MALIGNANT NEOPLASM OF UPPER-INNER QUADRANT OF RIGHT BREAST IN FEMALE, ESTROGEN RECEPTOR POSITIVE (H): Primary | ICD-10-CM

## 2022-11-02 DIAGNOSIS — C50.211 MALIGNANT NEOPLASM OF UPPER-INNER QUADRANT OF RIGHT BREAST IN FEMALE, ESTROGEN RECEPTOR POSITIVE (H): Primary | ICD-10-CM

## 2022-11-02 NOTE — PROGRESS NOTES
[facility]  SPECIALIZING IN BREAKTHROUGHS  Radiation Oncology    On Treatment Visit Note      Francie Polo      Date: 2022   MRN: [id number]   : 1955  Diagnosis: IDC ER/OH + Her2-      Reason for Visit:  On Radiation Treatment Visit     Treatment Summary to Date  Treatment Site: R breast Current Dose: 2136/4005 cGy Fractions: 8/15      Chemotherapy  Chemo concurrent with radx?: No    Subjective:  Doing well. No acute complaints. Mild tenderness near NAC at times. Energy is good. Applying skin creams. No pruritus.      Nursing ROS:   Nutrition Alteration  Diet Type: Patient's Preference  Skin  Skin Reaction: 0 - No changes  Skin Note: pt started skin care as directed        Cardiovascular  Respiratory effort: 1 - Normal - without distress           Pain Assessment  0-10 Pain Scale: 0  Pain Note: some chronic shoulder pain - pt reports managing fine with treatment position      Objective:   BP (!) 154/97   Pulse 98   Resp 18   Wt 89.8 kg (198 lb)   SpO2 96%   Skin with mild erythema without desquamation     Labs:  CBC RESULTS: No results for input(s): WBC, RBC, HGB, HCT, MCV, MCH, MCHC, RDW, PLT in the last 08403 hours.  ELECTROLYTES:  No results for input(s): NA, POTASSIUM, CHLORIDE, NATIVIDAD, CO2, BUN, CR, GLC in the last 75790 hours.    Assessment:  Ms. Polo is a 67 year old female with a recently diagnosed invasive ductal carcinoma of the RIGHT breast, ER+ / OH+ / HER2-, pT1cN0(sn), grade 1, -LVSI, +DCIS, negative margins, s/p lumpectomy + SLN biopsy 22. She is undergoing adjuvant RT.    Tolerating radiation therapy well.  All questions and concerns addressed.    Plan:   1. Continue current therapy.    2. Continue skin care.   3. Neosporin with pain relief PRN. Can also apply Telfa pad over NAC.      Mosaiq chart and setup information reviewed  Ports checked    Medication Review  Med list reviewed with patient?: Yes    Educational Topic Discussed  Education Instructions: skin care and  what SE to watch for      Shayan Pugh MD

## 2022-11-02 NOTE — LETTER
2022         RE: Francie Polo  [address] [address] [address]        Dear Colleague,    Thank you for referring your patient, Francie Polo, to the RADIATION THERAPY CENTER. Please see a copy of my visit note below.    [facility]  SPECIALIZING IN BREAKTHROUGHS  Radiation Oncology    On Treatment Visit Note      Francie Polo      Date: 2022   MRN: [id number]   : 1955  Diagnosis: IDC ER/RI + Her2-      Reason for Visit:  On Radiation Treatment Visit     Treatment Summary to Date  Treatment Site: R breast Current Dose: 2136/4005 cGy Fractions: 8/15      Chemotherapy  Chemo concurrent with radx?: No    Subjective:  Doing well. No acute complaints. Mild tenderness near NAC at times. Energy is good. Applying skin creams. No pruritus.      Nursing ROS:   Nutrition Alteration  Diet Type: Patient's Preference  Skin  Skin Reaction: 0 - No changes  Skin Note: pt started skin care as directed        Cardiovascular  Respiratory effort: 1 - Normal - without distress           Pain Assessment  0-10 Pain Scale: 0  Pain Note: some chronic shoulder pain - pt reports managing fine with treatment position      Objective:   BP (!) 154/97   Pulse 98   Resp 18   Wt 89.8 kg (198 lb)   SpO2 96%   Skin with mild erythema without desquamation     Labs:  CBC RESULTS: No results for input(s): WBC, RBC, HGB, HCT, MCV, MCH, MCHC, RDW, PLT in the last 26930 hours.  ELECTROLYTES:  No results for input(s): NA, POTASSIUM, CHLORIDE, NATIVIDAD, CO2, BUN, CR, GLC in the last 18588 hours.    Assessment:  Ms. Polo is a 67 year old female with a recently diagnosed invasive ductal carcinoma of the RIGHT breast, ER+ / RI+ / HER2-, pT1cN0(sn), grade 1, -LVSI, +DCIS, negative margins, s/p lumpectomy + SLN biopsy 22. She is undergoing adjuvant RT.    Tolerating radiation therapy well.  All questions and concerns addressed.    Plan:   1. Continue current therapy.    2. Continue skin care.   3. Neosporin with pain  relief PRN. Can also apply Telfa pad over NAC.      Mosaiq chart and setup information reviewed  Ports checked    Medication Review  Med list reviewed with patient?: Yes    Educational Topic Discussed  Education Instructions: skin care and what SE to watch for      Shayan Pugh MD

## 2022-11-03 ENCOUNTER — APPOINTMENT (OUTPATIENT)
Dept: RADIATION THERAPY | Facility: OUTPATIENT CENTER | Age: 67
End: 2022-11-03
Payer: COMMERCIAL

## 2022-11-04 ENCOUNTER — APPOINTMENT (OUTPATIENT)
Dept: RADIATION THERAPY | Facility: OUTPATIENT CENTER | Age: 67
End: 2022-11-04
Payer: COMMERCIAL

## 2022-11-07 ENCOUNTER — APPOINTMENT (OUTPATIENT)
Dept: RADIATION THERAPY | Facility: OUTPATIENT CENTER | Age: 67
End: 2022-11-07
Payer: COMMERCIAL

## 2022-11-08 ENCOUNTER — APPOINTMENT (OUTPATIENT)
Dept: RADIATION THERAPY | Facility: OUTPATIENT CENTER | Age: 67
End: 2022-11-08
Payer: COMMERCIAL

## 2022-11-09 ENCOUNTER — APPOINTMENT (OUTPATIENT)
Dept: RADIATION THERAPY | Facility: OUTPATIENT CENTER | Age: 67
End: 2022-11-09
Payer: COMMERCIAL

## 2022-11-09 ENCOUNTER — OFFICE VISIT (OUTPATIENT)
Dept: RADIATION THERAPY | Facility: OUTPATIENT CENTER | Age: 67
End: 2022-11-09
Payer: COMMERCIAL

## 2022-11-09 VITALS
DIASTOLIC BLOOD PRESSURE: 85 MMHG | WEIGHT: 201 LBS | OXYGEN SATURATION: 96 % | RESPIRATION RATE: 18 BRPM | HEART RATE: 100 BPM | SYSTOLIC BLOOD PRESSURE: 135 MMHG

## 2022-11-09 DIAGNOSIS — Z17.0 MALIGNANT NEOPLASM OF UPPER-INNER QUADRANT OF RIGHT BREAST IN FEMALE, ESTROGEN RECEPTOR POSITIVE (H): Primary | ICD-10-CM

## 2022-11-09 DIAGNOSIS — C50.211 MALIGNANT NEOPLASM OF UPPER-INNER QUADRANT OF RIGHT BREAST IN FEMALE, ESTROGEN RECEPTOR POSITIVE (H): Primary | ICD-10-CM

## 2022-11-09 NOTE — PROGRESS NOTES
[facility]  SPECIALIZING IN BREAKTHROUGHS  Radiation Oncology    On Treatment Visit Note      Francie Polo      Date: 2022   MRN: [id number]   : 1955  Diagnosis: IDC ER/OK + Her2-      Reason for Visit:  On Radiation Treatment Visit     Treatment Summary to Date  Treatment Site: R breast Current Dose: 3471/4005 cGy Fractions: 13/15      Chemotherapy  Chemo concurrent with radx?: No    Subjective:  Doing well. No acute complaints. Mild tenderness near NAC at times. Energy is good. Applying skin creams. No pruritus.      Nursing ROS:   Nutrition Alteration  Diet Type: Patient's Preference  Skin  Skin Reaction: 0 - No changes  Skin Note: pt started skin care as directed        Cardiovascular  Respiratory effort: 1 - Normal - without distress           Pain Assessment  0-10 Pain Scale: 0  Pain Note: some chronic shoulder pain - pt reports managing fine with treatment position      Objective:   /85   Pulse 100   Resp 18   Wt 91.2 kg (201 lb)   SpO2 96%   Skin with mild erythema without desquamation     Labs:  CBC RESULTS: No results for input(s): WBC, RBC, HGB, HCT, MCV, MCH, MCHC, RDW, PLT in the last 48395 hours.  ELECTROLYTES:  No results for input(s): NA, POTASSIUM, CHLORIDE, NATIVIDAD, CO2, BUN, CR, GLC in the last 97531 hours.    Assessment:  Ms. Polo is a 67 year old female with a recently diagnosed invasive ductal carcinoma of the RIGHT breast, ER+ / OK+ / HER2-, pT1cN0(sn), grade 1, -LVSI, +DCIS, negative margins, s/p lumpectomy + SLN biopsy 22. She is undergoing adjuvant RT.    Tolerating radiation therapy well.  All questions and concerns addressed.    Plan:   1. Continue current therapy. EOT on Friday. RTC in 1 month, will see PA.    2. Continue skin care.   3. Neosporin with pain relief PRN. Can also apply Telfa pad over NAC.      [employer] chart and setup information reviewed  Ports checked    Medication Review  Med list reviewed with patient?: Yes    Educational Topic  Discussed  Education Instructions: skin care and what SE to watch for      Shayan Pugh MD

## 2022-11-09 NOTE — LETTER
2022         RE: Francie Polo  [address] [facility] [address]        Dear Colleague,    Thank you for referring your patient, Francie Polo, to the RADIATION THERAPY CENTER. Please see a copy of my visit note below.    [facility]  SPECIALIZING IN BREAKTHROUGHS  Radiation Oncology    On Treatment Visit Note      Francie Polo      Date: 2022   MRN: [id number]   : 1955  Diagnosis: IDC ER/HI + Her2-      Reason for Visit:  On Radiation Treatment Visit     Treatment Summary to Date  Treatment Site: R breast Current Dose: 3471/4005 cGy Fractions: 13/15      Chemotherapy  Chemo concurrent with radx?: No    Subjective:  Doing well. No acute complaints. Mild tenderness near NAC at times. Energy is good. Applying skin creams. No pruritus.      Nursing ROS:   Nutrition Alteration  Diet Type: Patient's Preference  Skin  Skin Reaction: 0 - No changes  Skin Note: pt started skin care as directed        Cardiovascular  Respiratory effort: 1 - Normal - without distress           Pain Assessment  0-10 Pain Scale: 0  Pain Note: some chronic shoulder pain - pt reports managing fine with treatment position      Objective:   /85   Pulse 100   Resp 18   Wt 91.2 kg (201 lb)   SpO2 96%   Skin with mild erythema without desquamation     Labs:  CBC RESULTS: No results for input(s): WBC, RBC, HGB, HCT, MCV, MCH, MCHC, RDW, PLT in the last 11906 hours.  ELECTROLYTES:  No results for input(s): NA, POTASSIUM, CHLORIDE, NATIVIDAD, CO2, BUN, CR, GLC in the last 65841 hours.    Assessment:  Ms. Polo is a 67 year old female with a recently diagnosed invasive ductal carcinoma of the RIGHT breast, ER+ / HI+ / HER2-, pT1cN0(sn), grade 1, -LVSI, +DCIS, negative margins, s/p lumpectomy + SLN biopsy 22. She is undergoing adjuvant RT.    Tolerating radiation therapy well.  All questions and concerns addressed.    Plan:   1. Continue current therapy. EOT on Friday. RTC in 1 month, will see PA.     2. Continue skin care.   3. Neosporin with pain relief PRN. Can also apply Telfa pad over NAC.      Mosaiq chart and setup information reviewed  Ports checked    Medication Review  Med list reviewed with patient?: Yes    Educational Topic Discussed  Education Instructions: skin care and what SE to watch for      Shayan Pugh MD

## 2022-11-10 ENCOUNTER — APPOINTMENT (OUTPATIENT)
Dept: RADIATION THERAPY | Facility: OUTPATIENT CENTER | Age: 67
End: 2022-11-10
Payer: COMMERCIAL

## 2022-11-11 ENCOUNTER — APPOINTMENT (OUTPATIENT)
Dept: RADIATION THERAPY | Facility: OUTPATIENT CENTER | Age: 67
End: 2022-11-11
Payer: COMMERCIAL

## 2022-12-06 ENCOUNTER — DOCUMENTATION ONLY (OUTPATIENT)
Dept: RADIATION THERAPY | Facility: OUTPATIENT CENTER | Age: 67
End: 2022-12-06

## 2022-12-06 NOTE — PROGRESS NOTES
Radiotherapy Treatment Summary              PATIENT: Francie Polo  MEDICAL RECORD NO: [id number]   : 1955    DIAGNOSIS: Right breast cancer  INTENT OF RADIOTHERAPY: Adjuvant  PATHOLOGY: Invasive ductal carcinoma                                STAGE: pT1c N0  CONCURRENT SYSTEMIC THERAPY: No        ONCOLOGIC HISTORY:          Ms. Polo is a 67 year old female with a recently diagnosed right breast cancer.      She initially underwent screening bilateral mammograms on 8/10/22. A developing density in the retroareolar RIGHT breast was noted. Diagnostic mammogram right breast 22 showed a spiculated mass of the central breast, 2:00, 3-4 cm behind the nipple. US showed an irregular hypoechoic solid mass, 0.8 x 0.7 x 0.7 cm. A biopsy was done, which revealed invasive ductal carcinoma, [place] grade 1, +DCIS nuclear grade 2, ER+ 99% / NJ+ 54% / HER2- by IHC.      She underwent right lumpectomy with SLN biopsy on 22. Pathology demonstrated a 1.2 cm invasive ductal carcinoma, [place] grade 1, +DCIS nuclear grade 2 measuring 3.5 cm, negative margins (closest invasive 6 mm laterally; closest DCIS <1 mm laterally). No LVSI. 0/1 sentinel lymph node was positive.      She saw Dr. Batres 10/11/22. Adjuvant chemotherapy was not recommended. Adjuvant AI was discussed.     SITE OF TREATMENT: Right breast    DATES  OF TREATMENT: 10/24/22 to 22    TOTAL DOSE OF TREATMENT: 4,005 cGy    DOSE PER FRACTION OF TREATMENT: 267 cGy x 15 fractions       COMMENT/TOXICITY:  None                PAIN MANAGEMENT:  Neosporin                          FOLLOW UP PLAN: One month    CC  Patient Care Team:  Shayan Pugh MD as Assigned Cancer Care Provider     KRYSTINA Patrick  Department of Radiation Oncology  [facility]

## 2022-12-09 ENCOUNTER — OFFICE VISIT (OUTPATIENT)
Dept: RADIATION THERAPY | Facility: OUTPATIENT CENTER | Age: 67
End: 2022-12-09
Payer: COMMERCIAL

## 2022-12-09 VITALS — WEIGHT: 200 LBS | HEIGHT: 63 IN | BODY MASS INDEX: 35.44 KG/M2

## 2022-12-09 DIAGNOSIS — Z17.0 MALIGNANT NEOPLASM OF UPPER-INNER QUADRANT OF RIGHT BREAST IN FEMALE, ESTROGEN RECEPTOR POSITIVE (H): Primary | ICD-10-CM

## 2022-12-09 DIAGNOSIS — C50.211 MALIGNANT NEOPLASM OF UPPER-INNER QUADRANT OF RIGHT BREAST IN FEMALE, ESTROGEN RECEPTOR POSITIVE (H): Primary | ICD-10-CM

## 2022-12-09 NOTE — LETTER
2022         RE: Francie Polo  [address] [facility] [address]        Dear Colleague,    Thank you for referring your patient, Francie Polo, to the [facility]. Please see a copy of my visit note below.       Department of Radiation Oncology  Radiation Therapy Center  [facility]  [place] [address]  [phone]       Radiation Oncology Follow-up Visit  2022      Francie Polo  MRN: [id number]   : 1955     DIAGNOSIS: Right breast cancer  INTENT OF RADIOTHERAPY: Adjuvant  PATHOLOGY: Invasive ductal carcinoma                                STAGE: pT1c N0  CONCURRENT SYSTEMIC THERAPY: No         ONCOLOGIC HISTORY:          Ms. Polo is a 67 year old female with a recently diagnosed right breast cancer.      She initially underwent screening bilateral mammograms on 8/10/22. A developing density in the retroareolar RIGHT breast was noted. Diagnostic mammogram right breast 22 showed a spiculated mass of the central breast, 2:00, 3-4 cm behind the nipple. US showed an irregular hypoechoic solid mass, 0.8 x 0.7 x 0.7 cm. A biopsy was done, which revealed invasive ductal carcinoma, [place] grade 1, +DCIS nuclear grade 2, ER+ 99% / NM+ 54% / HER2- by IHC.      She underwent right lumpectomy with SLN biopsy on 22. Pathology demonstrated a 1.2 cm invasive ductal carcinoma, [place] grade 1, +DCIS nuclear grade 2 measuring 3.5 cm, negative margins (closest invasive 6 mm laterally; closest DCIS <1 mm laterally). No LVSI. 0/1 sentinel lymph node was positive.      She saw Dr. Batres 10/11/22. Adjuvant chemotherapy was not recommended. Adjuvant AI was discussed.      SITE OF TREATMENT: Right breast     DATES  OF TREATMENT: 10/24/22 to 22     TOTAL DOSE OF TREATMENT: 4,005 cGy     DOSE PER FRACTION OF TREATMENT: 267 cGy x 15 fractions    SUBJECTIVE: She is doing well. No energy deficits. No complaints. She is very active in the summer  "but less so in winter. Started AI last week. Has some pre existing joint pains. Here with . No complaints    INTERVAL SINCE COMPLETION OF RADIATION THERAPY: one month    PHYSICAL EXAM:  Ht 1.6 m (5' 3\")   Wt 90.7 kg (200 lb)   BMI 35.43 kg/m    There were no vitals taken for this visit.   Gen: Alert, in NAD  Eyes: PERRL, EOMI, sclera anicteric  HENT     Head: NC/AT     Ears: No external auricular lesions  Neck: Supple, full ROM, no LAD  Pulm: No wheezing, stridor or respiratory distress  CV: Well-perfused, no cyanosis, no pedal edema, left chest wall pacemaker  Right surgical scar well healed, right axillary scar well healed. Mild skin hyperpigmentation. No masses, nodules, skin changes or nipple discharge.  Abdominal: Soft, nontender, nondistended, no hepatomegaly  Back: No step-offs or pain to palpation along the thoracolumbar spine, no CVA tenderness  Musculoskeletal: Normal bulk and tone   Skin: Normal color and turgor  Neurologic: A/Ox3, CN II-XII intact  Psychiatric: Appropriate mood and affect    LABS AND IMAGING:  Reviewed.    IMPRESSION:   Ms. Polo is a 67 year old female with a pT1c N0 IDCER+ 99% / MT+ 54% / HER2- by IHC. She underwent right lumpectomy with SLN biopsy on 9/19/22. Pathology demonstrated a 1.2 cm invasive ductal carcinoma, [place] grade 1, +DCIS nuclear grade 2 measuring 3.5 cm, negative margins (closest invasive 6 mm laterally; closest DCIS <1 mm laterally). No LVSI. 0/1 sentinel lymph node was positive.      She saw Dr. Batres who put her on AI. She is doing well. Healing well. Skin looks good. Tolerated radiation well.    PLAN:   RTO in 6 mo in person with me.    KRYSTINA Patrick  Department of Radiation Oncology  [facility]         "

## 2022-12-09 NOTE — PROGRESS NOTES
"   Department of Radiation Oncology  Radiation Therapy Center  [facility]  [place] [address]  [phone]       Radiation Oncology Follow-up Visit  2022      Francie Polo  MRN: [id number]   : 1955     DIAGNOSIS: Right breast cancer  INTENT OF RADIOTHERAPY: Adjuvant  PATHOLOGY: Invasive ductal carcinoma                                STAGE: pT1c N0  CONCURRENT SYSTEMIC THERAPY: No         ONCOLOGIC HISTORY:          Ms. Polo is a 67 year old female with a recently diagnosed right breast cancer.      She initially underwent screening bilateral mammograms on 8/10/22. A developing density in the retroareolar RIGHT breast was noted. Diagnostic mammogram right breast 22 showed a spiculated mass of the central breast, 2:00, 3-4 cm behind the nipple. US showed an irregular hypoechoic solid mass, 0.8 x 0.7 x 0.7 cm. A biopsy was done, which revealed invasive ductal carcinoma, [place] grade 1, +DCIS nuclear grade 2, ER+ 99% / HI+ 54% / HER2- by IHC.      She underwent right lumpectomy with SLN biopsy on 22. Pathology demonstrated a 1.2 cm invasive ductal carcinoma, [place] grade 1, +DCIS nuclear grade 2 measuring 3.5 cm, negative margins (closest invasive 6 mm laterally; closest DCIS <1 mm laterally). No LVSI. 0/1 sentinel lymph node was positive.      She saw Dr. Batres 10/11/22. Adjuvant chemotherapy was not recommended. Adjuvant AI was discussed.      SITE OF TREATMENT: Right breast     DATES  OF TREATMENT: 10/24/22 to 22     TOTAL DOSE OF TREATMENT: 4,005 cGy     DOSE PER FRACTION OF TREATMENT: 267 cGy x 15 fractions    SUBJECTIVE: She is doing well. No energy deficits. No complaints. She is very active in the summer but less so in winter. Started AI last week. Has some pre existing joint pains. Here with . No complaints    INTERVAL SINCE COMPLETION OF RADIATION THERAPY: one month    PHYSICAL EXAM:  Ht 1.6 m (5' 3\")   Wt 90.7 kg (200 lb)   BMI " 35.43 kg/m    There were no vitals taken for this visit.   Gen: Alert, in NAD  Eyes: PERRL, EOMI, sclera anicteric  HENT     Head: NC/AT     Ears: No external auricular lesions  Neck: Supple, full ROM, no LAD  Pulm: No wheezing, stridor or respiratory distress  CV: Well-perfused, no cyanosis, no pedal edema, left chest wall pacemaker  Right surgical scar well healed, right axillary scar well healed. Mild skin hyperpigmentation. No masses, nodules, skin changes or nipple discharge.  Abdominal: Soft, nontender, nondistended, no hepatomegaly  Back: No step-offs or pain to palpation along the thoracolumbar spine, no CVA tenderness  Musculoskeletal: Normal bulk and tone   Skin: Normal color and turgor  Neurologic: A/Ox3, CN II-XII intact  Psychiatric: Appropriate mood and affect    LABS AND IMAGING:  Reviewed.    IMPRESSION:   Ms. Polo is a 67 year old female with a pT1c N0 IDCER+ 99% / CT+ 54% / HER2- by IHC. She underwent right lumpectomy with SLN biopsy on 9/19/22. Pathology demonstrated a 1.2 cm invasive ductal carcinoma, [place] grade 1, +DCIS nuclear grade 2 measuring 3.5 cm, negative margins (closest invasive 6 mm laterally; closest DCIS <1 mm laterally). No LVSI. 0/1 sentinel lymph node was positive.      She saw Dr. Batres who put her on AI. She is doing well. Healing well. Skin looks good. Tolerated radiation well.    PLAN:   RTO in 6 mo in person with me.    KRYSTINA Patrick  Department of Radiation Oncology  [facility]

## 2023-06-09 ENCOUNTER — OFFICE VISIT (OUTPATIENT)
Dept: RADIATION THERAPY | Facility: OUTPATIENT CENTER | Age: 68
End: 2023-06-09
Payer: COMMERCIAL

## 2023-06-09 VITALS
WEIGHT: 196 LBS | OXYGEN SATURATION: 96 % | SYSTOLIC BLOOD PRESSURE: 141 MMHG | BODY MASS INDEX: 36.07 KG/M2 | DIASTOLIC BLOOD PRESSURE: 82 MMHG | HEIGHT: 62 IN | HEART RATE: 65 BPM

## 2023-06-09 DIAGNOSIS — E66.01 MORBID OBESITY (H): ICD-10-CM

## 2023-06-09 DIAGNOSIS — C50.211 MALIGNANT NEOPLASM OF UPPER-INNER QUADRANT OF RIGHT BREAST IN FEMALE, ESTROGEN RECEPTOR POSITIVE (H): Primary | ICD-10-CM

## 2023-06-09 DIAGNOSIS — Z17.0 MALIGNANT NEOPLASM OF UPPER-INNER QUADRANT OF RIGHT BREAST IN FEMALE, ESTROGEN RECEPTOR POSITIVE (H): Primary | ICD-10-CM

## 2023-06-09 NOTE — LETTER
2023         RE: Francie Polo  [address] [facility] [address]        Dear Colleague,    Thank you for referring your patient, Francie Polo, to the [facility]. Please see a copy of my visit note below.       Department of Radiation Oncology  Radiation Therapy Center  [facility]  [place] [address]  [phone]       Radiation Oncology Follow-up Visit  23      Francie Polo  MRN: [id number]   : 1955     DIAGNOSIS: Right breast cancer  INTENT OF RADIOTHERAPY: Adjuvant  PATHOLOGY: Invasive ductal carcinoma                                STAGE: pT1c N0  CONCURRENT SYSTEMIC THERAPY: No         ONCOLOGIC HISTORY:          Ms. Polo is a 68 year old female with a history of right breast cancer.      She initially underwent screening bilateral mammograms on 8/10/22. A developing density in the retroareolar RIGHT breast was noted. Diagnostic mammogram right breast 22 showed a spiculated mass of the central breast, 2:00, 3-4 cm behind the nipple. US showed an irregular hypoechoic solid mass, 0.8 x 0.7 x 0.7 cm. A biopsy was done, which revealed invasive ductal carcinoma, [place] grade 1, +DCIS nuclear grade 2, ER+ 99% / [place]+ 54% / HER2- by IHC.      She underwent right lumpectomy with SLN biopsy on 22. Pathology demonstrated a 1.2 cm invasive ductal carcinoma, [place] grade 1, +DCIS nuclear grade 2 measuring 3.5 cm, negative margins (closest invasive 6 mm laterally; closest DCIS <1 mm laterally). No LVSI. 0/1 sentinel lymph node was positive.      She saw Dr. Batres 10/11/22. Adjuvant chemotherapy was not recommended. Adjuvant AI was discussed. she is on AI     SITE OF TREATMENT: Right breast     DATES  OF TREATMENT: 10/24/22 to 22     TOTAL DOSE OF TREATMENT: 4,005 cGy     DOSE PER FRACTION OF TREATMENT: 267 cGy x 15 fractions    SUBJECTIVE: She is doing well. No energy deficits. No complaints. She is very active in the  "summer but less so in winter.  Has some pre existing joint pains. Here with . No complaints    INTERVAL SINCE COMPLETION OF RADIATION THERAPY: 6 mo    PHYSICAL EXAM:  BP (!) 141/82   Pulse 65   Ht 1.575 m (5' 2\")   Wt 88.9 kg (196 lb)   SpO2 96%   BMI 35.85 kg/m    Gen: Alert, in NAD  Eyes: PERRL, EOMI, sclera anicteric  HENT     Head: NC/AT     Ears: No external auricular lesions  Neck: Supple, full ROM, no LAD  Pulm: No wheezing, stridor or respiratory distress  CV: Well-perfused, no cyanosis, no pedal edema, left chest wall pacemaker  Right surgical scar well healed, right axillary scar well healed. Mild skin hyperpigmentation. No masses, nodules, skin changes or nipple discharge.  Abdominal: Soft, nontender, nondistended, no hepatomegaly  Back: No step-offs or pain to palpation along the thoracolumbar spine, no CVA tenderness  Musculoskeletal: Normal bulk and tone   Skin: Normal color and turgor  Neurologic: A/Ox3, CN II-XII intact  Psychiatric: Appropriate mood and affect    LABS AND IMAGING:  Reviewed.    IMPRESSION:   Ms. Polo is a 68 year old female with a pT1c N0 IDCER+ 99% / AK+ 54% / HER2- by IHC. She underwent right lumpectomy with SLN biopsy on 9/19/22. Pathology demonstrated a 1.2 cm invasive ductal carcinoma, [place] grade 1, +DCIS nuclear grade 2 measuring 3.5 cm, negative margins (closest invasive 6 mm laterally; closest DCIS <1 mm laterally). No LVSI. 0/1 sentinel lymph node was positive.      She saw Dr. Batres who put her on AI. She is doing well. Healing well. Skin looks good. Tolerated radiation well.    PLAN:   She wants to return prn. This is reasonable since her skin is completely healed and she follows with medical oncology every 6 months. Defer to them for imaging and follow up    KRYSTINA Patrick  Department of Radiation Oncology  [facility]           "

## 2023-06-09 NOTE — PROGRESS NOTES
"   Department of Radiation Oncology  Radiation Therapy Center  [facility]  [place] [address]  [phone]       Radiation Oncology Follow-up Visit  23      Francie Polo  MRN: [id number]   : 1955     DIAGNOSIS: Right breast cancer  INTENT OF RADIOTHERAPY: Adjuvant  PATHOLOGY: Invasive ductal carcinoma                                STAGE: pT1c N0  CONCURRENT SYSTEMIC THERAPY: No         ONCOLOGIC HISTORY:          Ms. Polo is a 68 year old female with a history of right breast cancer.      She initially underwent screening bilateral mammograms on 8/10/22. A developing density in the retroareolar RIGHT breast was noted. Diagnostic mammogram right breast 22 showed a spiculated mass of the central breast, 2:00, 3-4 cm behind the nipple. US showed an irregular hypoechoic solid mass, 0.8 x 0.7 x 0.7 cm. A biopsy was done, which revealed invasive ductal carcinoma, [place] grade 1, +DCIS nuclear grade 2, ER+ 99% / [place]+ 54% / HER2- by IHC.      She underwent right lumpectomy with SLN biopsy on 22. Pathology demonstrated a 1.2 cm invasive ductal carcinoma, [place] grade 1, +DCIS nuclear grade 2 measuring 3.5 cm, negative margins (closest invasive 6 mm laterally; closest DCIS <1 mm laterally). No LVSI. 0/1 sentinel lymph node was positive.      She saw Dr. Batres 10/11/22. Adjuvant chemotherapy was not recommended. Adjuvant AI was discussed. she is on AI     SITE OF TREATMENT: Right breast     DATES  OF TREATMENT: 10/24/22 to 22     TOTAL DOSE OF TREATMENT: 4,005 cGy     DOSE PER FRACTION OF TREATMENT: 267 cGy x 15 fractions    SUBJECTIVE: She is doing well. No energy deficits. No complaints. She is very active in the summer but less so in winter.  Has some pre existing joint pains. Here with . No complaints    INTERVAL SINCE COMPLETION OF RADIATION THERAPY: 6 mo    PHYSICAL EXAM:  BP (!) 141/82   Pulse 65   Ht 1.575 m (5' 2\")   Wt 88.9 kg (196 lb)   SpO2 " 96%   BMI 35.85 kg/m    Gen: Alert, in NAD  Eyes: PERRL, EOMI, sclera anicteric  HENT     Head: NC/AT     Ears: No external auricular lesions  Neck: Supple, full ROM, no LAD  Pulm: No wheezing, stridor or respiratory distress  CV: Well-perfused, no cyanosis, no pedal edema, left chest wall pacemaker  Right surgical scar well healed, right axillary scar well healed. Mild skin hyperpigmentation. No masses, nodules, skin changes or nipple discharge.  Abdominal: Soft, nontender, nondistended, no hepatomegaly  Back: No step-offs or pain to palpation along the thoracolumbar spine, no CVA tenderness  Musculoskeletal: Normal bulk and tone   Skin: Normal color and turgor  Neurologic: A/Ox3, CN II-XII intact  Psychiatric: Appropriate mood and affect    LABS AND IMAGING:  Reviewed.    IMPRESSION:   Ms. Polo is a 68 year old female with a pT1c N0 IDCER+ 99% / OR+ 54% / HER2- by IHC. She underwent right lumpectomy with SLN biopsy on 9/19/22. Pathology demonstrated a 1.2 cm invasive ductal carcinoma, [place] grade 1, +DCIS nuclear grade 2 measuring 3.5 cm, negative margins (closest invasive 6 mm laterally; closest DCIS <1 mm laterally). No LVSI. 0/1 sentinel lymph node was positive.      She saw Dr. Batres who put her on AI. She is doing well. Healing well. Skin looks good. Tolerated radiation well.    PLAN:   She wants to return prn. This is reasonable since her skin is completely healed and she follows with medical oncology every 6 months. Defer to them for imaging and follow up    KRYSTINA Patrick  Department of Radiation Oncology  [facility]

## 2023-09-15 ASSESSMENT — HOOS JR
WALKING ON UNEVEN SURFACE: EXTREME
IMPORTED HOOS JR SCORE: 52.97
SITTING: MILD
HOOS JR RAW SCORE: 3
IMPORTED FORM: YES
RISING FROM SITTING: EXTREME
IMPORTED HOOS JR SCORE: 80.55
WALKING ON UNEVEN SURFACE: MODERATE
SITTING: SEVERE
RISING FROM SITTING: MODERATE
LYING IN BED (TURNING OVER, MAINTAINING HIP POSITION): MODERATE
GOING UP OR DOWN STAIRS: EXTREME
GOING UP OR DOWN STAIRS: MODERATE
SITTING: MODERATE
BENDING TO THE FLOOR TO PICK UP OBJECT: MODERATE
HOOS JR RAW SCORE: 12
RISING FROM SITTING: MILD
HOOS JR RAW SCORE: 22
BENDING TO THE FLOOR TO PICK UP OBJECT: EXTREME
IMPORTED LATERALITY: LEFT
IMPORTED HOOS JR SCORE: 15.63
BENDING TO THE FLOOR TO PICK UP OBJECT: MILD
LYING IN BED (TURNING OVER, MAINTAINING HIP POSITION): SEVERE